# Patient Record
Sex: MALE | Race: WHITE | NOT HISPANIC OR LATINO | URBAN - METROPOLITAN AREA
[De-identification: names, ages, dates, MRNs, and addresses within clinical notes are randomized per-mention and may not be internally consistent; named-entity substitution may affect disease eponyms.]

---

## 2018-02-23 ENCOUNTER — NEW PATIENT (OUTPATIENT)
Dept: URBAN - METROPOLITAN AREA CLINIC 52 | Facility: CLINIC | Age: 68
End: 2018-02-23

## 2018-02-23 VITALS — SYSTOLIC BLOOD PRESSURE: 129 MMHG | HEART RATE: 57 BPM | HEIGHT: 60 IN | DIASTOLIC BLOOD PRESSURE: 75 MMHG

## 2018-02-23 DIAGNOSIS — H43.813: ICD-10-CM

## 2018-02-23 DIAGNOSIS — H25.13: ICD-10-CM

## 2018-02-23 DIAGNOSIS — H33.323: ICD-10-CM

## 2018-02-23 DIAGNOSIS — H40.013: ICD-10-CM

## 2018-02-23 PROCEDURE — 4040F PNEUMOC VAC/ADMIN/RCVD: CPT

## 2018-02-23 PROCEDURE — 67145 PROPH RTA DTCHMNT PC: CPT

## 2018-02-23 PROCEDURE — G8482 FLU IMMUNIZE ORDER/ADMIN: HCPCS

## 2018-02-23 PROCEDURE — 2027F OPTIC NERVE HEAD EVAL DONE: CPT

## 2018-02-23 PROCEDURE — 1036F TOBACCO NON-USER: CPT

## 2018-02-23 PROCEDURE — 92225 OPHTHALMOSCOPY (INITIAL): CPT

## 2018-02-23 PROCEDURE — G8427 DOCREV CUR MEDS BY ELIG CLIN: HCPCS

## 2018-02-23 PROCEDURE — 0517F GLAUCOMA PLAN OF CARE DOCD: CPT

## 2018-02-23 PROCEDURE — 99244 OFF/OP CNSLTJ NEW/EST MOD 40: CPT

## 2018-02-23 ASSESSMENT — TONOMETRY
OS_IOP_MMHG: 18
OD_IOP_MMHG: 23

## 2018-02-23 ASSESSMENT — VISUAL ACUITY
OS_CC: 20/20
OD_CC: 20/30+
OD_CC: 20/40
OS_CC: 20/25+

## 2018-03-02 ENCOUNTER — POST-OP CHECK (OUTPATIENT)
Dept: URBAN - METROPOLITAN AREA CLINIC 52 | Facility: CLINIC | Age: 68
End: 2018-03-02

## 2018-03-02 DIAGNOSIS — H43.813: ICD-10-CM

## 2018-03-02 DIAGNOSIS — H25.13: ICD-10-CM

## 2018-03-02 DIAGNOSIS — H40.013: ICD-10-CM

## 2018-03-02 DIAGNOSIS — H33.323: ICD-10-CM

## 2018-03-02 PROCEDURE — 67145 PROPH RTA DTCHMNT PC: CPT

## 2018-03-02 PROCEDURE — 92226 OPHTHALMOSCOPY (SUB): CPT

## 2018-03-02 PROCEDURE — 99024 POSTOP FOLLOW-UP VISIT: CPT

## 2018-03-02 ASSESSMENT — VISUAL ACUITY
OD_CC: 20/25+1
OS_CC: 20/20-1

## 2018-03-02 ASSESSMENT — TONOMETRY
OS_IOP_MMHG: 21
OD_IOP_MMHG: 22

## 2018-03-26 ENCOUNTER — POST-OP CHECK (OUTPATIENT)
Dept: URBAN - METROPOLITAN AREA CLINIC 52 | Facility: CLINIC | Age: 68
End: 2018-03-26

## 2018-03-26 DIAGNOSIS — H33.323: ICD-10-CM

## 2018-03-26 DIAGNOSIS — H43.813: ICD-10-CM

## 2018-03-26 DIAGNOSIS — H25.13: ICD-10-CM

## 2018-03-26 DIAGNOSIS — H40.013: ICD-10-CM

## 2018-03-26 PROCEDURE — 92226 OPHTHALMOSCOPY (SUB): CPT

## 2018-03-26 PROCEDURE — 99024 POSTOP FOLLOW-UP VISIT: CPT

## 2018-03-26 ASSESSMENT — TONOMETRY
OD_IOP_MMHG: 21
OS_IOP_MMHG: 16

## 2018-03-26 ASSESSMENT — VISUAL ACUITY
OD_CC: 20/25
OS_CC: 20/20

## 2018-06-25 ENCOUNTER — FOLLOW UP (OUTPATIENT)
Dept: URBAN - METROPOLITAN AREA CLINIC 52 | Facility: CLINIC | Age: 68
End: 2018-06-25

## 2018-06-25 DIAGNOSIS — H40.013: ICD-10-CM

## 2018-06-25 DIAGNOSIS — H25.13: ICD-10-CM

## 2018-06-25 DIAGNOSIS — H43.813: ICD-10-CM

## 2018-06-25 DIAGNOSIS — H33.323: ICD-10-CM

## 2018-06-25 PROCEDURE — 4040F PNEUMOC VAC/ADMIN/RCVD: CPT

## 2018-06-25 PROCEDURE — 2027F OPTIC NERVE HEAD EVAL DONE: CPT

## 2018-06-25 PROCEDURE — 92014 COMPRE OPH EXAM EST PT 1/>: CPT

## 2018-06-25 PROCEDURE — 1036F TOBACCO NON-USER: CPT

## 2018-06-25 PROCEDURE — G8427 DOCREV CUR MEDS BY ELIG CLIN: HCPCS

## 2018-06-25 PROCEDURE — 92226 OPHTHALMOSCOPY (SUB): CPT

## 2018-06-25 PROCEDURE — 0517F GLAUCOMA PLAN OF CARE DOCD: CPT

## 2018-06-25 PROCEDURE — 92020 GONIOSCOPY: CPT

## 2018-06-25 PROCEDURE — G8482 FLU IMMUNIZE ORDER/ADMIN: HCPCS

## 2018-06-25 ASSESSMENT — VISUAL ACUITY
OS_CC: 20/25
OD_CC: 20/25
OD_CC: 20/25
OS_CC: 20/20

## 2018-06-25 ASSESSMENT — TONOMETRY
OS_IOP_MMHG: 22
OS_IOP_MMHG: 23
OD_IOP_MMHG: 30
OD_IOP_MMHG: 28

## 2018-10-30 ENCOUNTER — FOLLOW UP (OUTPATIENT)
Dept: URBAN - METROPOLITAN AREA CLINIC 52 | Facility: CLINIC | Age: 68
End: 2018-10-30

## 2018-10-30 DIAGNOSIS — H43.813: ICD-10-CM

## 2018-10-30 DIAGNOSIS — H40.013: ICD-10-CM

## 2018-10-30 DIAGNOSIS — H33.323: ICD-10-CM

## 2018-10-30 DIAGNOSIS — H25.13: ICD-10-CM

## 2018-10-30 PROCEDURE — 92226 OPHTHALMOSCOPY (SUB): CPT

## 2018-10-30 PROCEDURE — 92014 COMPRE OPH EXAM EST PT 1/>: CPT

## 2018-10-30 PROCEDURE — G9903 PT SCRN TBCO ID AS NON USER: HCPCS

## 2018-10-30 PROCEDURE — G8482 FLU IMMUNIZE ORDER/ADMIN: HCPCS

## 2018-10-30 PROCEDURE — 4040F PNEUMOC VAC/ADMIN/RCVD: CPT

## 2018-10-30 PROCEDURE — 0517F GLAUCOMA PLAN OF CARE DOCD: CPT

## 2018-10-30 PROCEDURE — 2027F OPTIC NERVE HEAD EVAL DONE: CPT

## 2018-10-30 PROCEDURE — 1036F TOBACCO NON-USER: CPT

## 2018-10-30 PROCEDURE — G8427 DOCREV CUR MEDS BY ELIG CLIN: HCPCS

## 2018-10-30 ASSESSMENT — TONOMETRY
OS_IOP_MMHG: 31
OS_IOP_MMHG: 30
OD_IOP_MMHG: 35

## 2018-10-30 ASSESSMENT — VISUAL ACUITY
OD_CC: 20/20-1
OS_CC: 20/20
OS_CC: 20/25
OD_CC: 20/20

## 2019-03-14 ENCOUNTER — FOLLOW UP (OUTPATIENT)
Dept: URBAN - METROPOLITAN AREA CLINIC 52 | Facility: CLINIC | Age: 69
End: 2019-03-14

## 2019-03-14 DIAGNOSIS — H43.813: ICD-10-CM

## 2019-03-14 DIAGNOSIS — H40.013: ICD-10-CM

## 2019-03-14 DIAGNOSIS — H25.13: ICD-10-CM

## 2019-03-14 DIAGNOSIS — H33.323: ICD-10-CM

## 2019-03-14 PROCEDURE — 92014 COMPRE OPH EXAM EST PT 1/>: CPT

## 2019-03-14 PROCEDURE — 92226 OPHTHALMOSCOPY (SUB): CPT

## 2019-03-14 ASSESSMENT — VISUAL ACUITY
OD_CC: 20/20
OS_CC: 20/20
OS_CC: 20/25
OD_CC: 20/25

## 2019-03-14 ASSESSMENT — TONOMETRY
OD_IOP_MMHG: 19
OS_IOP_MMHG: 17

## 2020-09-08 ENCOUNTER — FOLLOW UP (OUTPATIENT)
Dept: URBAN - METROPOLITAN AREA CLINIC 52 | Facility: CLINIC | Age: 70
End: 2020-09-08

## 2020-09-08 VITALS — HEIGHT: 60 IN

## 2020-09-08 DIAGNOSIS — H25.13: ICD-10-CM

## 2020-09-08 DIAGNOSIS — H43.813: ICD-10-CM

## 2020-09-08 DIAGNOSIS — H40.013: ICD-10-CM

## 2020-09-08 DIAGNOSIS — H33.323: ICD-10-CM

## 2020-09-08 PROCEDURE — 92201 OPSCPY EXTND RTA DRAW UNI/BI: CPT

## 2020-09-08 PROCEDURE — 92014 COMPRE OPH EXAM EST PT 1/>: CPT

## 2020-09-08 ASSESSMENT — TONOMETRY
OS_IOP_MMHG: 18
OD_IOP_MMHG: 23

## 2020-09-08 ASSESSMENT — VISUAL ACUITY
OS_CC: 20/25
OD_CC: 20/25

## 2024-04-18 ENCOUNTER — TELEPHONE (OUTPATIENT)
Age: 74
End: 2024-04-18

## 2024-04-18 NOTE — TELEPHONE ENCOUNTER
"Grace from the [facility] at [facility] called to inquire if Hanna Peñaloza or Dr. Patel could see Pt for an Ear Cleaning. Pt has a NEW Pt appt with Dr. Patel 5/24/24 but has an Auditory Appt on 4/25/24 and would like it done prior to this Appt.   TC to Hanna Peñaloza. She is ok to see Pt for Ear Cleaning with Debrox prior (\"5 drops bilateral ears two times daily for 4 days\"). Scheduled for 4/23/24/@ 9am, 30min visit.   Called Grace at number she provided. Left Message for her to call back to discuss appt and necessary pre-appointment prep. Left Office Contact Information.  "

## 2024-04-18 NOTE — TELEPHONE ENCOUNTER
Grace called office, relayed information about Debrox and Appt. Grace made note of appt and expressed thanks.

## 2024-04-23 ENCOUNTER — OFFICE VISIT (OUTPATIENT)
Dept: GERIATRICS | Facility: OTHER | Age: 74
End: 2024-04-23
Payer: COMMERCIAL

## 2024-04-23 VITALS
HEIGHT: 64 IN | DIASTOLIC BLOOD PRESSURE: 68 MMHG | SYSTOLIC BLOOD PRESSURE: 122 MMHG | TEMPERATURE: 98.1 F | HEART RATE: 63 BPM | WEIGHT: 186.6 LBS | BODY MASS INDEX: 31.86 KG/M2 | OXYGEN SATURATION: 97 %

## 2024-04-23 DIAGNOSIS — H61.23 EXCESSIVE CERUMEN IN BOTH EAR CANALS: Primary | ICD-10-CM

## 2024-04-23 PROCEDURE — 69210 REMOVE IMPACTED EAR WAX UNI: CPT | Performed by: NURSE PRACTITIONER

## 2024-04-23 RX ORDER — ROPINIROLE 0.5 MG/1
0.5 TABLET, FILM COATED ORAL DAILY
COMMUNITY
Start: 2024-01-01

## 2024-04-23 RX ORDER — LATANOPROST 50 UG/ML
1 SOLUTION/ DROPS OPHTHALMIC
COMMUNITY

## 2024-04-23 NOTE — PROGRESS NOTES
Ear cerumen removal    Date/Time: 4/23/2024 9:00 AM    Performed by: CARLOS ENRIQUE Ge  Authorized by: CARLOS ENRIQUE Ge  Universal Protocol:  Consent: Verbal consent obtained.  Consent given by: patient  Patient understanding: patient states understanding of the procedure being performed  Patient consent: the patient's understanding of the procedure matches consent given  Patient identity confirmed: verbally with patient    Patient location:  Clinic  Indications / Diagnosis:  Excessive cerumen in bilateral ear canals  Procedure details:     Local anesthetic:  None    Location:  L ear and R ear    Procedure type: irrigation with instrumentation      Instrumentation: curette      Approach:  Internal and natural orifice  Post-procedure details:     Complication:  None    Hearing quality:  Improved    Patient tolerance of procedure:  Tolerated well, no immediate complications    [facility]

## 2024-04-25 ENCOUNTER — OFFICE VISIT (OUTPATIENT)
Dept: AUDIOLOGY | Age: 74
End: 2024-04-25
Payer: COMMERCIAL

## 2024-04-25 DIAGNOSIS — H90.3 SENSORY HEARING LOSS, BILATERAL: Primary | ICD-10-CM

## 2024-04-25 PROCEDURE — 92557 COMPREHENSIVE HEARING TEST: CPT | Performed by: AUDIOLOGIST

## 2024-04-25 PROCEDURE — 92567 TYMPANOMETRY: CPT | Performed by: AUDIOLOGIST

## 2024-04-25 PROCEDURE — V5274 ALD UNSPECIFIED: HCPCS | Performed by: AUDIOLOGIST

## 2024-04-25 NOTE — PROGRESS NOTES
Hearing Aid Visit:    Name:  Louis Aguilar  :  1950  Age:  73 y.o.  MRN:  [id number]  Date of Evaluation: 24     HISTORY:    Louis Aguilar was seen today (2024) for a(n) out-of-warranty hearing aid check of his bilateral hearing aids. Today, Louis reports that his right hearing aid doesn' hold its charge the whole day.    DEVICE INFORMATION:     Left Device Right Device   Hearing Aid Make: OtWatch-Sites  OtWatch-Sites    Hearing Aid Model: Vandana 1 Vandana 1   Serial Number: [id number]   Repair Warranty Date: 4/15/24 4/15/24   Loss/Damage Warranty Status:             Length/Output 2, 85 2, 85   Wax System: Pro Wax miniFIT Pro Wax miniFIT   Dome Size/Style: 10mm Open 10mm Open   Battery: Lithium-ion Rechargeable Lithium-ion Rechargeable      Earmold Serial Number: N/A N/A   Earmold Warranty Date:  N/A N/A    Serial Number:       Warranty Date:  4/15/24     Accessories:          ACTION/ADJUSTMENTS:    Hearing aids cleaned and checked - good sound quality. Connected to NITA - battery health poor in right aid. Changed battery. Dispensed domes and wax guards.    RECOMMENDATIONS:     Patient will contact center with any concerns.      Pauline Fernando  Clinical Audiologist  [facility]  [address] [place] [id number]

## 2024-04-25 NOTE — PROGRESS NOTES
Diagnostic Hearing Evaluation    Name:  Louis Aguilar  :  1950  Age:  73 y.o.   MRN:  [id number]  Date of Evaluation: 24     HISTORY:     Reason for visit:  known hearing loss, aided    Louis Aguilar is being seen today at the request of Dr. Patel for an initial  evaluation of hearing. Patient reports history of bilateral hearing loss.  Patient denies otalgia, but reports bilateral intermittent tinnitus and some episodes of dizziness/lightheadedness. He notes previous episodes of vertigo, but no recent episodes.    EVALUATION:    Otoscopic Evaluation:   Right Ear: Unremarkable, canal clear   Left Ear: Unremarkable, canal clear    Tympanometry:   Right Ear: Type A; normal middle ear pressure and static compliance    Left Ear: Type A; normal middle ear pressure and static compliance     Speech Audiometry:  Speech Reception (SRT)    Right Ear: 25 dB HL    Left Ear: 25 dB HL    Word Recognition Scores (WRS):  Right Ear: excellent (100 % correct)     Left Ear: excellent (100 % correct)    Stimuli: [id number]    Pure Tone Audiometry:  Conventional pure tone audiometry from 250 - 8000 Hz  was obtained with good reliability and revealed borderline normal hearing [id number] Hz sloping to moderate sensorineural hearing loss in each ear.    *see attached audiogram      RECOMMENDATIONS:  Annual hearing eval, Return to [facility]. for F/U, and Copy to Patient/Caregiver    PATIENT EDUCATION:   The results of today's results and recommendations were reviewed with the patient and his hearing thresholds were explained at length. Treatment options, including amplification and communication strategies, were discussed as appropriate. The patient voiced understanding of his test results. Questions were addressed and the patient was encouraged to contact our department should concerns arise.      Pauline Fernando  Clinical Audiologist  [facility]  [address]  [phone]

## 2024-05-22 ENCOUNTER — OFFICE VISIT (OUTPATIENT)
Dept: NEUROLOGY | Facility: CLINIC | Age: 74
End: 2024-05-22
Payer: COMMERCIAL

## 2024-05-22 ENCOUNTER — TELEPHONE (OUTPATIENT)
Dept: NEUROLOGY | Facility: CLINIC | Age: 74
End: 2024-05-22

## 2024-05-22 VITALS — HEART RATE: 59 BPM | SYSTOLIC BLOOD PRESSURE: 138 MMHG | DIASTOLIC BLOOD PRESSURE: 68 MMHG

## 2024-05-22 DIAGNOSIS — G25.0 TREMOR, ESSENTIAL: Primary | ICD-10-CM

## 2024-05-22 PROCEDURE — 99204 OFFICE O/P NEW MOD 45 MIN: CPT | Performed by: PSYCHIATRY & NEUROLOGY

## 2024-05-22 RX ORDER — TOPIRAMATE 25 MG/1
TABLET ORAL
Qty: 90 TABLET | Refills: 1 | Status: SHIPPED | OUTPATIENT
Start: 2024-05-22 | End: 2024-05-23

## 2024-05-22 NOTE — PROGRESS NOTES
Patient ID: Louis Aguilar is a 73 y.o. male.    Assessment/Plan:    Tremor, essential  Patient is a 73 year old with hx of mild L carotid stenosis presenting for bilateral hand tremors. Tremors during action only but is currently on ropinirole 0.5mg TID. Tremors started in 2016 which then resolved and then returned 2023 and saw Neurologist in [place] who placed him on ropinirole and clonazepam. Patient stopped clonazepam because of sedation issues and continued on ropinirole but has difficulty keeping with frequency of dosing. Patient unsure if ropinirole is helping at this time. Denies falls, significant bradykinesia, cogwheel rigidity, hypophonia, masked facies. Otherwise, no numbness, no weakness, no other ambulatory dysfunction except possibly some mild slowing. No family history of Parkinson's nor essential tremors. Exam significant for bilateral L>R intention and some postural tremors but no rest tremors observed. No shuffling gait, asymmetric armswing, cogwheel rigidity, postural instability seen on today's exam.    Workup from [place], [place]:  Carotid duplex 11/14/2023: Atherosclerotic plaque in carotid bulbs with less than 40% stenosis  EEG routine 1/11/2024: normal EEG    Impression: Patient most likely has essential tremors as tremors are only during action. There is some concern that ropinirole maybe affecting the clinical presentation today as patient did take ropinirole this morning.    Plan:  Discussed with Dr. Zapata  Start topamax 25mg nightly and then the week after increase to 50mg nightly (patient has no hx of nephrolithiasis)  Ordered MRI brain w and wo contrast due to asymmetry of tremors L>R) with relatively soon appearance of the tremors  Patient to followup in 3 months  Patient agreeable to above       Diagnoses and all orders for this visit:    Tremor, essential  -     MRI brain with and without contrast; Future  -     topiramate (Topamax) 25 mg tablet; Take 25mg (1 tab) nightly for 1 week  and then increase to 50mg (2 tabs) nightly after  -     BUN; Future  -     Creatinine, serum; Future           Subjective:    Patient is a 73 year old right handed man with hx of left carotid atherosclerosis, thrombocytopenia presenting for bilateral hand tremors L>R. Patient in 2016 was diagnosed with essential tremors in both hands which have subsided. According to Dr. Adams 10/31/2023, patient had been developing increasing tremors bilaterally of the hands but were getting worse with holding cup or spoon at itmes (according to patient, tremors starting worsening around a year ago during spring 2023). He also had generalized slowing of his activities and slow steppage gait and feeling unsteady. Tremors only during action and can be seen when using utensils. Never seen at rest. Never seen when buttoning shirt, brushing teeth and occasionally during writing as well. Also recurrent dizzy spells and lightheadedness. Patient placed on requip 0.5 TID and clonazepam 0.5mg BID. Patient stopped taking the clonazepam around a month ago because of sedative side effects. Patient has been up to increased to requip 0.5 4 times a day but has a hard time handling the more frequent dosing (currently at TID but but sometimes miss lunchtime dosing). Patient did take ropinirole this morning.    No falls in his history. Does not drop objects but can have some spillage of liquids. Patient gets self conscious due to the tremors.    Denies cogwheel rigidity, bradykinesia but in setting of stiff joints. Patient thinks maybe mild shuffling gait. No abnormal en bloc turning. No dysarthria or dysphagia. No hypophonia, no masked facies, maybe mild smaller handwriting. No constipation or REM behavior sleep disorder. Nobody in the family with Parkinson's Disease. Mother had dementia. Nobody else in the family with tremors.          The following portions of the patient's history were reviewed and updated as appropriate: He  has no past medical  history on file.  He   Patient Active Problem List    Diagnosis Date Noted    Tremor, essential 05/22/2024    Excessive cerumen in both ear canals 04/23/2024     He  has no past surgical history on file.  His family history is not on file.  He  has no history on file for tobacco use, alcohol use, and drug use.  Current Outpatient Medications   Medication Sig Dispense Refill    latanoprost (XALATAN) 0.005 % ophthalmic solution Administer 1 drop to both eyes daily at bedtime      rOPINIRole (REQUIP) 0.5 mg tablet Take 0.5 mg by mouth daily      Rosuvastatin Calcium 20 MG CPSP Take 20 mg by mouth daily      TAMSULOSIN HCL PO Take 0.4 mg by mouth daily      topiramate (Topamax) 25 mg tablet Take 25mg (1 tab) nightly for 1 week and then increase to 50mg (2 tabs) nightly after 90 tablet 1     No current facility-administered medications for this visit.     Current Outpatient Medications on File Prior to Visit   Medication Sig    latanoprost (XALATAN) 0.005 % ophthalmic solution Administer 1 drop to both eyes daily at bedtime    rOPINIRole (REQUIP) 0.5 mg tablet Take 0.5 mg by mouth daily    Rosuvastatin Calcium 20 MG CPSP Take 20 mg by mouth daily    TAMSULOSIN HCL PO Take 0.4 mg by mouth daily     No current facility-administered medications on file prior to visit.     He has No Known Allergies..         Objective:    Blood pressure 138/68, pulse 59.    Physical Exam  Eyes:      Extraocular Movements: Extraocular movements intact.   Neurological:      Motor: Motor strength is normal.     Deep Tendon Reflexes: Reflexes are normal and symmetric.   Psychiatric:         Speech: Speech normal.         Neurological Exam  Mental Status  Awake, alert and oriented to person, place and time. Speech is normal. Language is fluent with no aphasia. Attention and concentration are normal.    Cranial Nerves  CN II: Visual fields full to confrontation.  CN III, IV, VI: Extraocular movements intact bilaterally.  CN V: Facial sensation is  normal.  CN VII: Full and symmetric facial movement.  CN XI: Shoulder shrug strength is normal.  CN XII: Tongue midline without atrophy or fasciculations.    Motor   No abnormal involuntary movements. Strength is 5/5 throughout all four extremities.    Sensory  Light touch is normal in upper and lower extremities.     Reflexes  Deep tendon reflexes are 2+ and symmetric in all four extremities.    Coordination  Right: Finger-to-nose normal.Left: Finger-to-nose normal.  Spiral test shows L>R hand tremor    Bilateral intention tremor bilaterally of hands    No significant cogwheel rigidity bilaterally on contralateral activation    KIKI of bilateral upper and lower extremities slightly slower than normal.    Gait  Casual gait is normal including stance, stride, and arm swing.  No shuffling gait, symmetric armswing, normal en bloc turning.        ROS:    Review of Systems   Constitutional:  Positive for fatigue. Negative for appetite change and fever.   HENT: Negative.  Negative for hearing loss, tinnitus, trouble swallowing and voice change.    Eyes: Negative.  Negative for photophobia, pain and visual disturbance.   Respiratory: Negative.  Negative for shortness of breath.    Cardiovascular: Negative.  Negative for palpitations.   Gastrointestinal: Negative.  Negative for nausea and vomiting.   Endocrine: Negative.  Negative for cold intolerance.   Genitourinary: Negative.  Negative for dysuria, frequency and urgency.   Musculoskeletal:  Positive for gait problem (Slow walking). Negative for back pain, myalgias, neck pain and neck stiffness.        Joint Stiffness     Skin: Negative.  Negative for rash.   Allergic/Immunologic: Negative.    Neurological:  Positive for dizziness, tremors (Hands) and weakness (Minimal). Negative for seizures, syncope, facial asymmetry, speech difficulty, light-headedness, numbness and headaches.   Hematological: Negative.  Does not bruise/bleed easily.   Psychiatric/Behavioral:  Positive  for sleep disturbance. Negative for confusion and hallucinations.    All other systems reviewed and are negative.

## 2024-05-22 NOTE — PATIENT INSTRUCTIONS
Get MRI brain before next visit    Start topamax 25mg nightly and then increase to 50mg nightly next week    Followup in 3 months

## 2024-05-22 NOTE — ASSESSMENT & PLAN NOTE
Patient is a 73 year old with hx of mild L carotid stenosis presenting for bilateral hand tremors. Tremors during action only but is currently on ropinirole 0.5mg TID. Tremors started in 2016 which then resolved and then returned 2023 and saw Neurologist in [place] who placed him on ropinirole and clonazepam. Patient stopped clonazepam because of sedation issues and continued on ropinirole but has difficulty keeping with frequency of dosing. Patient unsure if ropinirole is helping at this time. Denies falls, significant bradykinesia, cogwheel rigidity, hypophonia, masked facies. Otherwise, no numbness, no weakness, no other ambulatory dysfunction except possibly some mild slowing. No family history of Parkinson's nor essential tremors. Exam significant for bilateral L>R intention and some postural tremors but no rest tremors observed. No shuffling gait, asymmetric armswing, cogwheel rigidity, postural instability seen on today's exam.    Workup from [place], [place]:  Carotid duplex 11/14/2023: Atherosclerotic plaque in carotid bulbs with less than 40% stenosis  EEG routine 1/11/2024: normal EEG    Impression: Patient most likely has essential tremors as tremors are only during action. There is some concern that ropinirole maybe affecting the clinical presentation today as patient did take ropinirole this morning.    Plan:  Discussed with Dr. Zapata  Start topamax 25mg nightly and then the week after increase to 50mg nightly (patient has no hx of nephrolithiasis)  Ordered MRI brain w and wo contrast due to asymmetry of tremors L>R) with relatively soon appearance of the tremors  Patient to followup in 3 months  Patient agreeable to above

## 2024-05-22 NOTE — TELEPHONE ENCOUNTER
Pt has concerns regarding side affects with the topiramate 25mg tablets. He stated he is a glaucoma suspect, he was dx with ocular hypertension.       Pt would like to confirm if this is safe before he starts the medication.     Pt#: [phone]

## 2024-05-23 DIAGNOSIS — G25.0 TREMOR, ESSENTIAL: Primary | ICD-10-CM

## 2024-05-23 PROBLEM — H61.23 EXCESSIVE CERUMEN IN BOTH EAR CANALS: Status: RESOLVED | Noted: 2024-04-23 | Resolved: 2024-05-23

## 2024-05-23 RX ORDER — PRIMIDONE 50 MG/1
TABLET ORAL
Qty: 60 TABLET | Refills: 5 | Status: SHIPPED | OUTPATIENT
Start: 2024-05-23

## 2024-05-23 NOTE — TELEPHONE ENCOUNTER
"Patient aware of recommendation and is receptive to start primidone instead of topamax.  Please send script to [facility].  Also patient is asking to please confirm the plan is to wean off requip as he feels it's \"not terribly effective\"  thank you.  "

## 2024-05-23 NOTE — TELEPHONE ENCOUNTER
Received voicemail 5/23/24 10:18am  Louis solo [facility] december 16. 1950. I was in to see doctor palak yesterday and she prescribed a medicine that I need that I'm not sure I should, I should be taking, taking based on my uh, uh, glaucoma suspected diagnosis. So she could call me back so we could discuss this further. I'd appreciate it. Thank you. [phone]

## 2024-05-23 NOTE — TELEPHONE ENCOUNTER
Laurita Soto MD  Ne[facility] [place] Clinical Team 457 minutes ago (2:43 PM)       Yes plan is to wean off of ropinirole.  He is to take 1 tab twice daily for 1 week then 1 tab daily for a week then discontinue.  Will send in script.   ____________    Patient aware of recommendation and verbalized understanding.

## 2024-05-23 NOTE — TELEPHONE ENCOUNTER
Laurita Soto MD  Neurology [place] Clinical Team 435 minutes ago (12:22 PM)       Secondary  angle closure glaucoma is listed as a possible adverse reaction. If there is a question, we should avoid.    We spoke of alternatives in the office. We could instead start primidone 50mg 1 tab nightly for a week then if no improvement  2 tabs nightly. Let me know if agreeable and script can be sent in.  Most common side effect is sedation.

## 2024-05-24 ENCOUNTER — OFFICE VISIT (OUTPATIENT)
Dept: GERIATRICS | Facility: OTHER | Age: 74
End: 2024-05-24
Payer: COMMERCIAL

## 2024-05-24 VITALS
BODY MASS INDEX: 33.31 KG/M2 | SYSTOLIC BLOOD PRESSURE: 124 MMHG | WEIGHT: 188 LBS | DIASTOLIC BLOOD PRESSURE: 80 MMHG | HEART RATE: 68 BPM | OXYGEN SATURATION: 97 % | TEMPERATURE: 97.3 F | HEIGHT: 63 IN

## 2024-05-24 DIAGNOSIS — G25.0 TREMOR, ESSENTIAL: ICD-10-CM

## 2024-05-24 DIAGNOSIS — Z23 NEED FOR TDAP VACCINATION: ICD-10-CM

## 2024-05-24 DIAGNOSIS — R97.20 BPH WITH ELEVATED PSA: ICD-10-CM

## 2024-05-24 DIAGNOSIS — N40.0 BPH WITH ELEVATED PSA: ICD-10-CM

## 2024-05-24 DIAGNOSIS — E66.09 CLASS 1 OBESITY DUE TO EXCESS CALORIES WITHOUT SERIOUS COMORBIDITY WITH BODY MASS INDEX (BMI) OF 33.0 TO 33.9 IN ADULT: ICD-10-CM

## 2024-05-24 DIAGNOSIS — K63.5 POLYP OF COLON, UNSPECIFIED PART OF COLON, UNSPECIFIED TYPE: Primary | ICD-10-CM

## 2024-05-24 DIAGNOSIS — E78.2 MIXED HYPERLIPIDEMIA: ICD-10-CM

## 2024-05-24 DIAGNOSIS — D69.6 THROMBOCYTOPENIA (HCC): ICD-10-CM

## 2024-05-24 PROBLEM — E66.9 OBESITY: Status: ACTIVE | Noted: 2024-05-24

## 2024-05-24 PROBLEM — H40.059 OCULAR HYPERTENSION: Status: ACTIVE | Noted: 2024-05-24

## 2024-05-24 PROBLEM — E78.5 HYPERLIPIDEMIA: Status: ACTIVE | Noted: 2024-05-24

## 2024-05-24 PROCEDURE — 99214 OFFICE O/P EST MOD 30 MIN: CPT | Performed by: FAMILY MEDICINE

## 2024-05-24 NOTE — ASSESSMENT & PLAN NOTE
Continue rosuvastatin  Able to climb a flight of stairs without chest pain or shortness of breath  Monitor symptoms with slowly increasing activity- consider stress test  Dietary/lifestyle modification reviewed  Follow up recent labs/ most recent TSH and lipid panel, will order accordingly at next visit

## 2024-05-24 NOTE — PATIENT INSTRUCTIONS
-Continue to monitor symptoms on primidone- side effects and is it helping your tremor  -Slowly increase exercise, note any symptoms of chest pain or shortness of breath  -Due for Tdap (tetanus) vaccine  -Follow up in 6-8 weeks

## 2024-05-24 NOTE — ASSESSMENT & PLAN NOTE
Last colonoscopy November 2023- awaiting records for review  Polyps removed per patient; +FH colon cancer- mother  Due for repeat colonoscopy in 3-5 years, would err closer to 3 years given family history, next due November 2026

## 2024-05-24 NOTE — ASSESSMENT & PLAN NOTE
Symptoms controlled with tamsulosin  Awaiting records, PSA downtrended and WNL when recently checked per patient  Will plan to recheck PSA in 6 months from last level, then return to yearly surveillance if WNL

## 2024-05-24 NOTE — ASSESSMENT & PLAN NOTE
Chronic, idiopathic per history with baseline platelets reported to be 90s-100s  Awaiting recent lab results  Will plan to check CBC, CMP, and B12 and folic acid levels if not recently checked

## 2024-05-24 NOTE — PROGRESS NOTES
[facility]  [facility]    NAME: Louis Aguilar  AGE: 73 y.o. SEX: male    DATE OF ENCOUNTER: 5/24/24    Assessment and Plan     Problem List Items Addressed This Visit          Digestive    Colon polyps - Primary     Last colonoscopy November 2023- awaiting records for review  Polyps removed per patient; +FH colon cancer- mother  Due for repeat colonoscopy in 3-5 years, would err closer to 3 years given family history, next due November 2026            Nervous and Auditory    Tremor, essential     Following with neurology  Started primidone last night, weaning from ropinirole  Monitor side effects and benefits from primidone, notify if unable to tolerate primidone due to sedation, dizziness, etc            Genitourinary    BPH with elevated PSA     Symptoms controlled with tamsulosin  Awaiting records, PSA downtrended and WNL when recently checked per patient  Will plan to recheck PSA in 6 months from last level, then return to yearly surveillance if WNL            Hematopoietic and Hemostatic    Thrombocytopenia (HCC)     Chronic, idiopathic per history with baseline platelets reported to be 90s-100s  Awaiting recent lab results  Will plan to check CBC, CMP, and B12 and folic acid levels if not recently checked            Other    Hyperlipidemia     Continue rosuvastatin  Able to climb a flight of stairs without chest pain or shortness of breath  Monitor symptoms with slowly increasing activity- consider stress test  Dietary/lifestyle modification reviewed  Follow up recent labs/ most recent TSH and lipid panel, will order accordingly at next visit         Obesity     Dietary and lifestyle modification reviewed         Need for Tdap vaccination     Due for Tdap- will send order to pharmacy         Relevant Medications    tetanus-diphtheria-acellular pertussis (ADACEL) 5-2-15.5 LF-mcg/0.5 injection       Chief Complaint     Chief Complaint   Patient presents with    New Patient Visit  "      History of Present Illness     73 year old male presents to establish care. Patient had requested records from PCP well ahead of our visit, however they are not available at time of encounter. Limited records pulled from [facility] and reviewed. Recent neurology and audiology notes in the [facility] system reviewed.   Started primidone last evening for essential tremor, feeling \"dopey\" when waking up. Weaning off of ropinirole. Diagnosed originally in 2016 vs preParkinson.   Recent PSA test with elevation, repeat one month again had gone down per patient; on tamsulosin. No symptoms.  Saw audiology in April, slight adjustments to bilateral hearing aids made.   Colonoscopy- 2023; some polyps removed; mother had colon cancer in 70s; next due in   On latanoprost for ocular hypertension; wears glasses. Has seen [facility] recently. Trouble with focus when reading for 15 minutes +. No eye pain or headache.   Blood work- recent PCP visit prior to move in March; reported recent PSA and metabolic panel.  Chronic thrombocytopenia- 90s-100s, stable.   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    Review of Systems     Review of Systems   Constitutional:  Positive for unexpected weight change (weight gain reported).   HENT:  Negative for trouble swallowing.    Respiratory:  Positive for shortness of breath (with walking longer " "distance).    Cardiovascular:  Negative for chest pain (can climb flight of stairs without chest pain, SOB).       Active Problem List     Patient Active Problem List   Diagnosis    Tremor, essential    Hyperlipidemia    Ocular hypertension    Obesity    BPH with elevated PSA    Thrombocytopenia (HCC)    Need for Tdap vaccination    Colon polyps       Objective     /80 (BP Location: Left arm, Patient Position: Sitting, Cuff Size: Standard)   Pulse 68   Temp (!) 97.3 °F (36.3 °C) (Temporal)   Ht 5' 3\" (1.6 m)   Wt 85.3 kg (188 lb)   SpO2 97%   BMI 33.30 kg/m²     Physical Exam  Vitals and nursing note reviewed.   Constitutional:       General: He is awake. He is not in acute distress.     Appearance: He is well-groomed. He is not ill-appearing, toxic-appearing or diaphoretic.   HENT:      Head: Normocephalic and atraumatic.      Nose: No rhinorrhea.      Mouth/Throat:      Mouth: Mucous membranes are moist.   Eyes:      General: No scleral icterus.        Right eye: No discharge.         Left eye: No discharge.      Conjunctiva/sclera: Conjunctivae normal.   Cardiovascular:      Rate and Rhythm: Normal rate and regular rhythm.      Heart sounds: S1 normal and S2 normal.   Pulmonary:      Effort: Pulmonary effort is normal. No respiratory distress.      Breath sounds: No wheezing.   Abdominal:      General: There is no distension.   Musculoskeletal:      Cervical back: Neck supple. No rigidity.   Lymphadenopathy:      Cervical: No cervical adenopathy.   Skin:     General: Skin is warm and dry.      Coloration: Skin is not jaundiced or pale.   Neurological:      Mental Status: He is alert.      Cranial Nerves: No dysarthria or facial asymmetry.   Psychiatric:         Attention and Perception: Attention and perception normal.         Mood and Affect: Mood and affect normal.         Behavior: Behavior is cooperative.       Pertinent Laboratory/Diagnostic Studies:  BUN 15, Creat 1.15 (5/22/24)     Current " Medications     Current Outpatient Medications:     latanoprost (XALATAN) 0.005 % ophthalmic solution, Administer 1 drop to both eyes daily at bedtime, Disp: , Rfl:     primidone (MYSOLINE) 50 mg tablet, 1 po qhs x 1 week then 2 po qhs, Disp: 60 tablet, Rfl: 5    rOPINIRole (REQUIP) 0.5 mg tablet, Take 0.5 mg by mouth daily, Disp: , Rfl:     Rosuvastatin Calcium 20 MG CPSP, Take 20 mg by mouth daily, Disp: , Rfl:     TAMSULOSIN HCL PO, Take 0.4 mg by mouth daily, Disp: , Rfl:     tetanus-diphtheria-acellular pertussis (ADACEL) 5-2-15.5 LF-mcg/0.5 injection, Inject 0.5 mL into a muscle once for 1 dose, Disp: 0.5 mL, Rfl: 0    Mela Patel, DO  5/24/24

## 2024-05-24 NOTE — ASSESSMENT & PLAN NOTE
Following with neurology  Started primidone last night, weaning from ropinirole  Monitor side effects and benefits from primidone, notify if unable to tolerate primidone due to sedation, dizziness, etc

## 2024-06-14 DIAGNOSIS — G25.0 TREMOR, ESSENTIAL: ICD-10-CM

## 2024-06-17 RX ORDER — PRIMIDONE 50 MG/1
TABLET ORAL
Qty: 180 TABLET | Refills: 0 | Status: SHIPPED | OUTPATIENT
Start: 2024-06-17

## 2024-06-19 ENCOUNTER — HOSPITAL ENCOUNTER (OUTPATIENT)
Dept: MRI IMAGING | Facility: HOSPITAL | Age: 74
Discharge: HOME | End: 2024-06-19
Payer: COMMERCIAL

## 2024-06-19 DIAGNOSIS — G25.0 TREMOR, ESSENTIAL: ICD-10-CM

## 2024-06-19 PROCEDURE — 70553 MRI BRAIN STEM W/O & W/DYE: CPT

## 2024-06-19 PROCEDURE — A9585 GADOBUTROL INJECTION: HCPCS | Performed by: PSYCHIATRY & NEUROLOGY

## 2024-06-19 RX ORDER — GADOBUTROL 604.72 MG/ML
8.5 INJECTION INTRAVENOUS
Status: COMPLETED | OUTPATIENT
Start: 2024-06-19 | End: 2024-06-19

## 2024-06-19 RX ADMIN — GADOBUTROL 8.5 ML: 604.72 INJECTION INTRAVENOUS at 08:30

## 2024-07-26 ENCOUNTER — OFFICE VISIT (OUTPATIENT)
Dept: GERIATRICS | Facility: OTHER | Age: 74
End: 2024-07-26
Payer: COMMERCIAL

## 2024-07-26 VITALS
DIASTOLIC BLOOD PRESSURE: 80 MMHG | BODY MASS INDEX: 33.98 KG/M2 | SYSTOLIC BLOOD PRESSURE: 130 MMHG | OXYGEN SATURATION: 97 % | HEART RATE: 57 BPM | WEIGHT: 191.8 LBS | TEMPERATURE: 98.1 F

## 2024-07-26 DIAGNOSIS — D69.6 THROMBOCYTOPENIA (HCC): Primary | ICD-10-CM

## 2024-07-26 DIAGNOSIS — G25.0 TREMOR, ESSENTIAL: ICD-10-CM

## 2024-07-26 DIAGNOSIS — R97.20 BPH WITH ELEVATED PSA: ICD-10-CM

## 2024-07-26 DIAGNOSIS — E78.2 MIXED HYPERLIPIDEMIA: ICD-10-CM

## 2024-07-26 DIAGNOSIS — N40.0 BPH WITH ELEVATED PSA: ICD-10-CM

## 2024-07-26 PROCEDURE — 99214 OFFICE O/P EST MOD 30 MIN: CPT | Performed by: FAMILY MEDICINE

## 2024-07-26 NOTE — PROGRESS NOTES
[facility]  [facility]    NAME: Louis Aguilar  AGE: 73 y.o. SEX: male    DATE OF ENCOUNTER: 7/26/24    Assessment and Plan     Problem List Items Addressed This Visit          Nervous and Auditory    Tremor, essential     Following with neurology- next appointment in August  Patient reports no significant improvement with primidone; previously weaned from ropinirole   MRI brain report reviewed- ?need for NPH workup, will refer to neurology- no gait, cognitive, incontinence symptoms reported  Consider copper or iron deposition disease in setting of tremor, chronic thrombocytopenia- workup for thrombocytopenia as below with further  workup based on results (ie. Elevated Hgb or abnormal LFTs)            Genitourinary    BPH with elevated PSA     Symptoms controlled with tamsulosin  PSA improving per most recent labs- repeat PSA ordered         Relevant Orders    PSA Total (Reflex To Free)       Hematopoietic and Hemostatic    Thrombocytopenia (HCC) - Primary     Chronic, idiopathic per history with baseline platelets reported to be 90s-100s  Labs ordered: CBC, CMP, and B12 and folic acid levels         Relevant Orders    CBC and differential    Comprehensive metabolic panel    Folate    Vitamin B12       Other    Hyperlipidemia     Continue rosuvastatin  Dietary/lifestyle modification reviewed  Lipid panel, TSH ordered         Relevant Orders    TSH, 3rd generation with Free T4 reflex    Lipid panel       Orders Placed This Encounter   Procedures    TSH, 3rd generation with Free T4 reflex    Lipid panel    CBC and differential    Comprehensive metabolic panel    PSA Total (Reflex To Free)    Folate    Vitamin B12       Chief Complaint     Chief Complaint   Patient presents with    Follow-up     Weight concerns, weight has gone up since he has been at [facility] [email or web address]        History of Present Illness     73 year old male presents for follow up.  3lb weight gain since last visit. Dietary changes since  moving To [facility]. Has been trying to eat less overall.   Minimal exercise. Walking around the facility for usual daily activities but nothing structured.  No leg or abdominal swelling. Notes wheezing intermittently. Chronic sinus disease.  Initial lightheadedness with primidone. Tremors without significant change; some days better than others.   No known family history of liver disease or iron/copper deposition disease; no known family history of tremor.       The following portions of the patient's history were reviewed and updated as appropriate: allergies, current medications, past family history, past medical history, past social history, past surgical history and problem list.    Review of Systems     Review of Systems   Constitutional:  Positive for activity change and unexpected weight change. Negative for appetite change and fever.   HENT:  Negative for postnasal drip.    Respiratory:  Positive for wheezing. Negative for chest tightness and shortness of breath.    Cardiovascular:  Negative for leg swelling.   Gastrointestinal:  Negative for abdominal distention.   Genitourinary:  Negative for difficulty urinating.   Neurological:  Positive for tremors. Negative for dizziness and light-headedness.       Active Problem List     Patient Active Problem List   Diagnosis    Tremor, essential    Hyperlipidemia    Ocular hypertension    Obesity    BPH with elevated PSA    Thrombocytopenia (HCC)    Need for Tdap vaccination    Colon polyps       Objective     /80 (BP Location: Left arm, Patient Position: Sitting, Cuff Size: Adult)   Pulse 57   Temp 98.1 °F (36.7 °C) (Temporal)   Wt 87 kg (191 lb 12.8 oz)   SpO2 97%   BMI 33.98 kg/m²     Physical Exam  Vitals reviewed.   Constitutional:       General: He is awake. He is not in acute distress.     Appearance: He is well-groomed. He is not ill-appearing, toxic-appearing or diaphoretic.   HENT:      Head: Normocephalic and atraumatic.      Nose:  No rhinorrhea.      Mouth/Throat:      Mouth: Mucous membranes are moist.   Eyes:      General: No scleral icterus.        Right eye: No discharge.         Left eye: No discharge.      Conjunctiva/sclera: Conjunctivae normal.   Cardiovascular:      Rate and Rhythm: Normal rate and regular rhythm.   Pulmonary:      Effort: Pulmonary effort is normal. No respiratory distress.      Breath sounds: No wheezing.   Abdominal:      General: There is no distension.      Palpations: Abdomen is soft. There is no hepatomegaly, splenomegaly or mass.      Tenderness: There is no abdominal tenderness.   Musculoskeletal:      Cervical back: No rigidity.      Right lower leg: No edema.      Left lower leg: No edema.   Skin:     General: Skin is warm and dry.      Coloration: Skin is not jaundiced or pale.   Neurological:      Mental Status: He is alert. Mental status is at baseline.      Cranial Nerves: No dysarthria or facial asymmetry.   Psychiatric:         Attention and Perception: Attention and perception normal.         Speech: Speech normal.         Behavior: Behavior is cooperative.         Thought Content: Thought content normal.         Pertinent Laboratory/Diagnostic Studies:  No new labs since last visit  MRI brain (6/19/24) asymmetric ventricular enlargement; abnormal white matter signal    Current Medications     Current Outpatient Medications:     latanoprost (XALATAN) 0.005 % ophthalmic solution, Administer 1 drop to both eyes daily at bedtime, Disp: , Rfl:     primidone (MYSOLINE) 50 mg tablet, TAKE 1 TAB AT BEDTIME FOR 1 WEEK THEN 2 TABS AT BEDTIME, Disp: 180 tablet, Rfl: 0    Rosuvastatin Calcium 20 MG CPSP, Take 20 mg by mouth daily, Disp: , Rfl:     TAMSULOSIN HCL PO, Take 0.4 mg by mouth daily, Disp: , Rfl:     Mela Patel, DO  7/26/24

## 2024-08-17 NOTE — ASSESSMENT & PLAN NOTE
Following with neurology- next appointment in August  Patient reports no significant improvement with primidone; previously weaned from ropinirole   MRI brain report reviewed- ?need for NPH workup, will refer to neurology- no gait, cognitive, incontinence symptoms reported  Consider copper or iron deposition disease in setting of tremor, chronic thrombocytopenia- workup for thrombocytopenia as below with further  workup based on results (ie. Elevated Hgb or abnormal LFTs)

## 2024-08-17 NOTE — ASSESSMENT & PLAN NOTE
Chronic, idiopathic per history with baseline platelets reported to be 90s-100s  Labs ordered: CBC, CMP, and B12 and folic acid levels

## 2024-08-26 ENCOUNTER — OFFICE VISIT (OUTPATIENT)
Dept: NEUROLOGY | Facility: CLINIC | Age: 74
End: 2024-08-26
Payer: COMMERCIAL

## 2024-08-26 VITALS
WEIGHT: 193.2 LBS | BODY MASS INDEX: 34.22 KG/M2 | DIASTOLIC BLOOD PRESSURE: 70 MMHG | SYSTOLIC BLOOD PRESSURE: 138 MMHG | HEART RATE: 61 BPM

## 2024-08-26 DIAGNOSIS — R90.89 MRI OF BRAIN ABNORMAL: ICD-10-CM

## 2024-08-26 DIAGNOSIS — G25.0 TREMOR, ESSENTIAL: Primary | ICD-10-CM

## 2024-08-26 PROCEDURE — 99214 OFFICE O/P EST MOD 30 MIN: CPT | Performed by: PSYCHIATRY & NEUROLOGY

## 2024-08-26 RX ORDER — PRIMIDONE 50 MG/1
TABLET ORAL
Qty: 270 TABLET | Refills: 2 | Status: SHIPPED | OUTPATIENT
Start: 2024-08-26

## 2024-08-26 NOTE — PROGRESS NOTES
Review of Systems   Constitutional:  Positive for unexpected weight change (Weight Gain). Negative for appetite change, fatigue and fever.   HENT: Negative.  Negative for hearing loss, tinnitus, trouble swallowing and voice change.    Eyes: Negative.  Negative for photophobia, pain and visual disturbance.   Respiratory: Negative.  Negative for shortness of breath.    Cardiovascular: Negative.  Negative for palpitations.   Gastrointestinal: Negative.  Negative for nausea and vomiting.   Endocrine: Negative.  Negative for cold intolerance.   Genitourinary: Negative.  Negative for dysuria, frequency and urgency.   Musculoskeletal:  Positive for gait problem (At times shuffles feet) and myalgias (Left Knee and Thigh often get Stiff). Negative for back pain, neck pain and neck stiffness.   Skin: Negative.  Negative for rash.   Allergic/Immunologic: Negative.    Neurological:  Positive for tremors (Hands, has stayed about the same). Negative for dizziness, seizures, syncope, facial asymmetry, speech difficulty, weakness, light-headedness, numbness and headaches.   Hematological: Negative.  Does not bruise/bleed easily.   Psychiatric/Behavioral: Negative.  Negative for confusion, hallucinations and sleep disturbance.    All other systems reviewed and are negative.

## 2024-08-26 NOTE — ASSESSMENT & PLAN NOTE
Mild L>R right action tremor with subtle decrement on finger taps. Exam suggestive of mild essential tremor.  He is currently on primidone 100 mg nightly with out clear improvement in tremor.  Avoided beta-blockers given potential bradycardia.  We avoided topiramate given ocular hypertension question of glaucoma.    Mri brain reviewed- with asymmetric enlargement of the lateral ventricles and altered signal which could be white matter changes or possibly related to interstitial fluid associated with no pressure hydrocephalus.  Clinically his exam is not suggestive of normal pressure hydrocephalus.  He is has no cognitive difficulties.  He has urinary symptoms which he treats with tamsulosin.  Ambulating normally in office.  He does report occasional scuffing of the feet since wearing his new sneakers.  Time spent reviewing results.  Given he does not have clinical symptoms of NPH there would be no reason to refer him for high-volume tap.  Will continue to monitor for new neurological symptoms.  He will contact the office prior to follow-up should he develop difficulty with gait,  troubling urinary issues, or cognition.    With regard to tremor we will increase primidone 50mg 1 tab in the am  and 2 tab at bedtime.  If ineffective we can try increasing to 2 tabs twicw daily as long as it is tolerated.

## 2024-08-26 NOTE — PATIENT INSTRUCTIONS
Mild L>R right action tremor with subtle decrement on finger taps. Exam suggestive of mild essential tremor.     Will try increasing primidone 50mg 1 tab in the am  and 2 tab at bedtime.  If ineffective we can try increasing to 2 tabs twicw daily as long as it is tolerated.      Mri brain reviewed- showed microvascular changes and changes that can be seen with ihose with a condition called normal pressure hydrocephalus. At this time there are no signs or symptoms of NPH therefore no further testing ordered. Will monitor for changes such as worsening gait, urinary issues or cognitive changes,

## 2024-08-26 NOTE — PROGRESS NOTES
Patient ID: Louis Aguilar is a 73 y.o. male    Assessment/Plan:    Tremor, essential  Mild L>R right action tremor with subtle decrement on finger taps. Exam suggestive of mild essential tremor.  He is currently on primidone 100 mg nightly with out clear improvement in tremor.  Avoided beta-blockers given potential bradycardia.  We avoided topiramate given ocular hypertension question of glaucoma.    Mri brain reviewed- with asymmetric enlargement of the lateral ventricles and altered signal which could be white matter changes or possibly related to interstitial fluid associated with no pressure hydrocephalus.  Clinically his exam is not suggestive of normal pressure hydrocephalus.  He is has no cognitive difficulties.  He has urinary symptoms which he treats with tamsulosin.  Ambulating normally in office.  He does report occasional scuffing of the feet since wearing his new sneakers.  Time spent reviewing results.  Given he does not have clinical symptoms of NPH there would be no reason to refer him for high-volume tap.  Will continue to monitor for new neurological symptoms.  He will contact the office prior to follow-up should he develop difficulty with gait,  troubling urinary issues, or cognition.    With regard to tremor we will increase primidone 50mg 1 tab in the am  and 2 tab at bedtime.  If ineffective we can try increasing to 2 tabs twicw daily as long as it is tolerated.        Diagnoses and all orders for this visit:    Tremor, essential  -     primidone (MYSOLINE) 50 mg tablet; 1 po qam and 2 po qhs    MRI of brain abnormal        Subjective:      Louis Aguilar is a 73 year old with essential tremor who presents for movement follow up.   Bilateral action hand tremor present since 2016. Denies ever having any rest tremor, loss of dexterity or changes in gait. First seen May 2024 on ropinirole with subtle left postural tremor with hand outstretched, bilateral intentional tremor, perhaps mild decrement  on finger taps. More suggestive of ET than PD.     He was to taper off ropinirole as he is not sure if this ever provided any benefit and was started on primidone. Topiramate avoided given possible glaucoma.   Beta blockers given potential for bradycardia.     On primidone 50mg 2 tab nghtly. He may have noted mild initial improvement now tremor back to baseline.  Tremors continue to be present bilaterally, L>R. Present when picking up coffee or eating.  No rest tremor note. No head, leg or vocal tremor.   Handwriting is okay.     No speech changes.   Legs will get stiff at times when walking.  He will scuff his feet at times.   No difficulty chewing and swallowing.   Dressing independently.  Hygiene acts performed independently without difficulty  Sleeps well.   There have been no symptoms of REM sleep behavior disorder. His feet occasionally jerk when dosing off.              Objective:    /70 (BP Location: Left arm, Patient Position: Sitting, Cuff Size: Large)   Pulse 61   Wt 87.6 kg (193 lb 3.2 oz)   BMI 34.22 kg/m²       Physical Exam  Vitals reviewed.   Eyes:      Extraocular Movements: Extraocular movements intact.   Neurological:      Motor: Motor strength is normal.  Psychiatric:         Speech: Speech normal.         Neurological Exam  Mental Status   Oriented to person, place, time and situation. Recent and remote memory are intact. Speech is normal. Follows complex commands. Attention and concentration are normal.    Cranial Nerves  CN III, IV, VI: Extraocular movements intact bilaterally.   Right pupil: 1 mm.   Left pupil: 1 mm.  CN VII: Full and symmetric facial movement.  CN VIII: Hearing is normal.  CN IX, X: Palate elevates symmetrically  CN XI: Shoulder shrug strength is normal.  CN XII: Tongue midline without atrophy or fasciculations.    Motor   Normal muscle tone. Strength is 5/5 throughout all four extremities.    Coordination      Subtle left postural tremor with hand outstretched.  1  Bilateral intentional tremor on FTN L>R 2,1   No head, leg or vocal tremor.   Spirals with mild tremor 1,1      Handwriting normal.  No rest tremor.  mild decrement on finger taps   Normal HG, KIKI HT  .    Gait  Casual gait is normal including stance, stride, and arm swing.  Normal speed.  No freezing or festination.  Good clearance..    MRI brain - 6/19/2024       Altered signal involving white matter described above is a nonspecific finding often related to chronic small vessel white matter ischemic disease but in this instance could relate in part to increased interstitial fluid associated with normal pressure   hydrocephalus as there is asymmetric enlargement of lateral and 3rd ventricles relative to size of cortical sulci at the convexities.  Evens index 0.39; normal range up to 0.3.     Mild mucosal thickening bilateral maxillary and ethmoidal sinuses.              Laurita Soto MD  Movement disorder physician  [facility]

## 2024-09-03 ENCOUNTER — TELEPHONE (OUTPATIENT)
Dept: GERIATRICS | Facility: OTHER | Age: 74
End: 2024-09-03

## 2024-09-03 NOTE — TELEPHONE ENCOUNTER
Carlos needs papers filled out for him to use the gym downstairs. I do have the form for you. I just don't know if it needs to have an appointment to it or not.

## 2024-09-20 ENCOUNTER — OFFICE VISIT (OUTPATIENT)
Dept: GERIATRICS | Facility: OTHER | Age: 74
End: 2024-09-20
Payer: COMMERCIAL

## 2024-09-20 VITALS
TEMPERATURE: 98.2 F | DIASTOLIC BLOOD PRESSURE: 70 MMHG | RESPIRATION RATE: 15 BRPM | WEIGHT: 191.8 LBS | OXYGEN SATURATION: 98 % | BODY MASS INDEX: 33.98 KG/M2 | HEART RATE: 66 BPM | HEIGHT: 63 IN | SYSTOLIC BLOOD PRESSURE: 130 MMHG

## 2024-09-20 DIAGNOSIS — E66.09 CLASS 1 OBESITY DUE TO EXCESS CALORIES WITHOUT SERIOUS COMORBIDITY WITH BODY MASS INDEX (BMI) OF 33.0 TO 33.9 IN ADULT: ICD-10-CM

## 2024-09-20 DIAGNOSIS — H61.23 BILATERAL HEARING LOSS DUE TO CERUMEN IMPACTION: Primary | ICD-10-CM

## 2024-09-20 DIAGNOSIS — G25.0 TREMOR, ESSENTIAL: ICD-10-CM

## 2024-09-20 DIAGNOSIS — E78.2 MIXED HYPERLIPIDEMIA: ICD-10-CM

## 2024-09-20 DIAGNOSIS — R97.20 BPH WITH ELEVATED PSA: ICD-10-CM

## 2024-09-20 DIAGNOSIS — N40.0 BPH WITH ELEVATED PSA: ICD-10-CM

## 2024-09-20 PROCEDURE — 69210 REMOVE IMPACTED EAR WAX UNI: CPT | Performed by: NURSE PRACTITIONER

## 2024-09-20 PROCEDURE — 99214 OFFICE O/P EST MOD 30 MIN: CPT | Performed by: NURSE PRACTITIONER

## 2024-09-20 NOTE — PROGRESS NOTES
[facility] at [address]  [address]  [phone]    Older Adult Primary Care    ASSESSMENT AND PLAN:  1. Bilateral hearing loss due to cerumen impaction  Assessment & Plan:  Bilateral ear lavage performed today with no complication  2. Tremor, essential  Assessment & Plan:  Mild essential tremor left > right  Primidone increased to 50 mg tab in the am and 2 tabs at bedtime with no reported side effects.   MRI showed asymmetric enlargement of lateral ventricles and altered signal with possible white matter changes or interstitial fluid associated with NPH. Examination not suggestive of NPH at Neurology visit.   Patient states no change in tremor with increased dose.  No tremor on examination  Will consider increasing primidone to 2 tabs twice daily per Neurology recommendation if above change ineffective.  3. Class 1 obesity due to excess calories without serious comorbidity with body mass index (BMI) of 33.0 to 33.9 in adult  Assessment & Plan:  Continue dietary and lifestyle modifications.  4. BPH with elevated PSA  Assessment & Plan:  Symptoms controlled with tamsulosin  Patient due for repeat PSA level prior to next f/u appt.  5. Mixed hyperlipidemia  Assessment & Plan:  Continue rosuvastatin  Lipid panel and TSH due prior to next follow-up appt.   Continue diet and lifestyle modifications      Name: Louis Aguilar                 : 1950               Sex: male    HPI:    73-year-old male patient seen and examined today for ear lavage and follow-up on chronic medical conditions. He is status post Neurology follow-up for tremors of hand and abnormal MRI.  His primidone was increased to one tablet in the am and 2 tabs at HS. He states that there is no change in tremors as of yet.     The following portions of the patient's history were reviewed and updated as appropriate: allergies, current medications, past family history, past medical history,  past social history, past surgical history and problem list.    ROS: Review of Systems   Constitutional:  Negative for activity change, appetite change, fatigue and fever.   HENT:  Positive for hearing loss.         D/t cerumen    Eyes:  Negative for discharge and visual disturbance.   Respiratory:  Positive for wheezing. Negative for cough, chest tightness and shortness of breath.         Occasional wheezing   Cardiovascular:  Negative for chest pain.   Gastrointestinal:  Negative for abdominal pain, constipation, diarrhea, nausea and vomiting.   Endocrine: Negative for polydipsia, polyphagia and polyuria.   Genitourinary:  Negative for difficulty urinating, dysuria and frequency.   Musculoskeletal:  Positive for arthralgias.        BL knee pain occasionally     Skin:  Negative for rash and wound.   Neurological:  Positive for tremors. Negative for dizziness, weakness, numbness and headaches.   Psychiatric/Behavioral:  Negative for agitation and behavioral problems. The patient is not nervous/anxious.        No Known Allergies    Medications:    Current Outpatient Medications on File Prior to Visit   Medication Sig Dispense Refill    latanoprost (XALATAN) 0.005 % ophthalmic solution Administer 1 drop to both eyes daily at bedtime      primidone (MYSOLINE) 50 mg tablet 1 po qam and 2 po qhs 270 tablet 2    Rosuvastatin Calcium 20 MG CPSP Take 20 mg by mouth daily      TAMSULOSIN HCL PO Take 0.4 mg by mouth daily       No current facility-administered medications on file prior to visit.       History:  Past Medical History:   Diagnosis Date    Colon polyps     Hyperlipemia     Ocular hypertension     Thrombocytopenia (HCC)      Past Surgical History:   Procedure Laterality Date    SHOULDER ARTHROSCOPY Right      Family History   Problem Relation Age of Onset    Cancer Mother         colon cancer     Social History     Socioeconomic History    Marital status:      Spouse name: Not on file    Number of children:  "Not on file    Years of education: Not on file    Highest education level: Not on file   Occupational History    Not on file   Tobacco Use    Smoking status: Former     Types: Cigarettes     Passive exposure: Past (Quit smoking 40 years ago)    Smokeless tobacco: Never   Substance and Sexual Activity    Alcohol use: Not on file    Drug use: Not on file    Sexual activity: Not on file   Other Topics Concern    Not on file   Social History Narrative    Not on file     Social Determinants of Health     Financial Resource Strain: Not on file   Food Insecurity: Not on file   Transportation Needs: Not on file   Physical Activity: Not on file   Stress: Not on file   Social Connections: Not on file   Intimate Partner Violence: Not on file   Housing Stability: Not on file     Past Surgical History:   Procedure Laterality Date    SHOULDER ARTHROSCOPY Right        OBJECTIVE:  Vitals:    09/20/24 0952   BP: 130/70   BP Location: Left arm   Patient Position: Sitting   Cuff Size: Standard   Pulse: 66   Resp: 15   Temp: 98.2 °F (36.8 °C)   TempSrc: Temporal   SpO2: 98%   Weight: 87 kg (191 lb 12.8 oz)   Height: 5' 3\" (1.6 m)     Body mass index is 33.98 kg/m².  Physical Exam  Constitutional:       General: He is not in acute distress.     Appearance: Normal appearance. He is not ill-appearing, toxic-appearing or diaphoretic.   HENT:      Head: Normocephalic and atraumatic.      Right Ear: There is impacted cerumen.      Left Ear: There is impacted cerumen.      Nose: Nose normal.      Mouth/Throat:      Mouth: Mucous membranes are moist.   Eyes:      Extraocular Movements: Extraocular movements intact.      Conjunctiva/sclera: Conjunctivae normal.   Cardiovascular:      Rate and Rhythm: Normal rate and regular rhythm.      Pulses: Normal pulses.   Pulmonary:      Effort: Pulmonary effort is normal. No respiratory distress.      Breath sounds: Normal breath sounds. No wheezing, rhonchi or rales.   Abdominal:      General: Bowel " sounds are normal. There is no distension.      Palpations: Abdomen is soft. There is no mass.      Tenderness: There is no abdominal tenderness. There is no guarding.   Musculoskeletal:         General: Normal range of motion.   Skin:     General: Skin is warm and dry.   Neurological:      General: No focal deficit present.      Mental Status: He is alert and oriented to person, place, and time. Mental status is at baseline.      Motor: No weakness.      Gait: Gait normal.   Psychiatric:         Mood and Affect: Mood normal.         Behavior: Behavior normal.         Thought Content: Thought content normal.         Labs & Imaging Reviewed in EMR.    CARLOS ENRIQUE Ray  Geriatric Medicine  09/20/2024

## 2024-09-21 NOTE — ASSESSMENT & PLAN NOTE
Mild essential tremor left > right  Primidone increased to 50 mg tab in the am and 2 tabs at bedtime with no reported side effects.   MRI showed asymmetric enlargement of lateral ventricles and altered signal with possible white matter changes or interstitial fluid associated with NPH. Examination not suggestive of NPH at Neurology visit.   Patient states no change in tremor with increased dose.  No tremor on examination  Will consider increasing primidone to 2 tabs twice daily per Neurology recommendation if above change ineffective.

## 2024-09-21 NOTE — ASSESSMENT & PLAN NOTE
Continue rosuvastatin  Lipid panel and TSH due prior to next follow-up appt.   Continue diet and lifestyle modifications

## 2024-09-21 NOTE — PROGRESS NOTES
Ear cerumen removal    Date/Time: 9/20/2024 10:00 AM    Performed by: CARLOS ENRIQUE Ge  Authorized by: CARLOS ENRIQUE Ge  Universal Protocol:  Procedure performed by: (Mook Casas RN, CARLOS ENRIQUE student and VANESSA Ochoa student)  Consent given by: patient  Patient understanding: patient states understanding of the procedure being performed  Patient identity confirmed: verbally with patient    Patient location:  Clinic  Indications / Diagnosis:  Hearing loss due to bilateral cerumen impaction  Procedure details:     Location:  L ear and R ear    Procedure type: irrigation with instrumentation      Instrumentation: curette      Approach:  Internal    Equipment used:  Water pik, curette, otoscope  Post-procedure details:     Complication:  None    Hearing quality:  Improved    [facility]  Older Adult Primary Care

## 2024-10-14 ENCOUNTER — APPOINTMENT (OUTPATIENT)
Dept: LAB | Facility: CLINIC | Age: 74
End: 2024-10-14
Payer: COMMERCIAL

## 2024-10-14 DIAGNOSIS — E78.2 MIXED HYPERLIPIDEMIA: ICD-10-CM

## 2024-10-14 DIAGNOSIS — N40.0 BPH WITH ELEVATED PSA: ICD-10-CM

## 2024-10-14 DIAGNOSIS — G25.0 TREMOR, ESSENTIAL: ICD-10-CM

## 2024-10-14 DIAGNOSIS — R97.20 BPH WITH ELEVATED PSA: ICD-10-CM

## 2024-10-14 DIAGNOSIS — D69.6 THROMBOCYTOPENIA (HCC): ICD-10-CM

## 2024-10-14 LAB
ALBUMIN SERPL BCG-MCNC: 4.4 G/DL (ref 3.5–5)
ALP SERPL-CCNC: 109 U/L (ref 34–104)
ALT SERPL W P-5'-P-CCNC: 20 U/L (ref 7–52)
ANION GAP SERPL CALCULATED.3IONS-SCNC: 8 MMOL/L (ref 4–13)
AST SERPL W P-5'-P-CCNC: 15 U/L (ref 13–39)
BASOPHILS # BLD AUTO: 0.04 THOUSANDS/ΜL (ref 0–0.1)
BASOPHILS NFR BLD AUTO: 1 % (ref 0–1)
BILIRUB SERPL-MCNC: 0.77 MG/DL (ref 0.2–1)
BUN SERPL-MCNC: 18 MG/DL (ref 5–25)
CALCIUM SERPL-MCNC: 9.4 MG/DL (ref 8.4–10.2)
CHLORIDE SERPL-SCNC: 103 MMOL/L (ref 96–108)
CHOLEST SERPL-MCNC: 205 MG/DL
CO2 SERPL-SCNC: 26 MMOL/L (ref 21–32)
CREAT SERPL-MCNC: 1.08 MG/DL (ref 0.6–1.3)
EOSINOPHIL # BLD AUTO: 0.12 THOUSAND/ΜL (ref 0–0.61)
EOSINOPHIL NFR BLD AUTO: 2 % (ref 0–6)
ERYTHROCYTE [DISTWIDTH] IN BLOOD BY AUTOMATED COUNT: 13 % (ref 11.6–15.1)
FOLATE SERPL-MCNC: 10.2 NG/ML
GFR SERPL CREATININE-BSD FRML MDRD: 67 ML/MIN/1.73SQ M
GLUCOSE P FAST SERPL-MCNC: 91 MG/DL (ref 65–99)
HCT VFR BLD AUTO: 49.1 % (ref 36.5–49.3)
HDLC SERPL-MCNC: 56 MG/DL
HGB BLD-MCNC: 16.7 G/DL (ref 12–17)
IMM GRANULOCYTES # BLD AUTO: 0.02 THOUSAND/UL (ref 0–0.2)
IMM GRANULOCYTES NFR BLD AUTO: 0 % (ref 0–2)
LDLC SERPL CALC-MCNC: 128 MG/DL (ref 0–100)
LYMPHOCYTES # BLD AUTO: 1.28 THOUSANDS/ΜL (ref 0.6–4.47)
LYMPHOCYTES NFR BLD AUTO: 25 % (ref 14–44)
MCH RBC QN AUTO: 33.3 PG (ref 26.8–34.3)
MCHC RBC AUTO-ENTMCNC: 34 G/DL (ref 31.4–37.4)
MCV RBC AUTO: 98 FL (ref 82–98)
MONOCYTES # BLD AUTO: 0.44 THOUSAND/ΜL (ref 0.17–1.22)
MONOCYTES NFR BLD AUTO: 9 % (ref 4–12)
NEUTROPHILS # BLD AUTO: 3.2 THOUSANDS/ΜL (ref 1.85–7.62)
NEUTS SEG NFR BLD AUTO: 63 % (ref 43–75)
NONHDLC SERPL-MCNC: 149 MG/DL
NRBC BLD AUTO-RTO: 0 /100 WBCS
PLATELET # BLD AUTO: 90 THOUSANDS/UL (ref 149–390)
PLATELET BLD QL SMEAR: ABNORMAL
PMV BLD AUTO: 13.6 FL (ref 8.9–12.7)
POTASSIUM SERPL-SCNC: 4.3 MMOL/L (ref 3.5–5.3)
PROT SERPL-MCNC: 6.6 G/DL (ref 6.4–8.4)
RBC # BLD AUTO: 5.02 MILLION/UL (ref 3.88–5.62)
RBC MORPH BLD: NORMAL
SODIUM SERPL-SCNC: 137 MMOL/L (ref 135–147)
TRIGL SERPL-MCNC: 103 MG/DL
TSH SERPL DL<=0.05 MIU/L-ACNC: 2.84 UIU/ML (ref 0.45–4.5)
VIT B12 SERPL-MCNC: 187 PG/ML (ref 180–914)
WBC # BLD AUTO: 5.1 THOUSAND/UL (ref 4.31–10.16)

## 2024-10-14 PROCEDURE — 85025 COMPLETE CBC W/AUTO DIFF WBC: CPT

## 2024-10-14 PROCEDURE — 82607 VITAMIN B-12: CPT

## 2024-10-14 PROCEDURE — 84153 ASSAY OF PSA TOTAL: CPT

## 2024-10-14 PROCEDURE — 36415 COLL VENOUS BLD VENIPUNCTURE: CPT

## 2024-10-14 PROCEDURE — 80053 COMPREHEN METABOLIC PANEL: CPT

## 2024-10-14 PROCEDURE — 84443 ASSAY THYROID STIM HORMONE: CPT

## 2024-10-14 PROCEDURE — 80061 LIPID PANEL: CPT

## 2024-10-14 PROCEDURE — 82746 ASSAY OF FOLIC ACID SERUM: CPT

## 2024-10-18 LAB — MISCELLANEOUS LAB TEST RESULT: NORMAL

## 2024-10-20 PROBLEM — H61.23 BILATERAL HEARING LOSS DUE TO CERUMEN IMPACTION: Status: RESOLVED | Noted: 2024-04-23 | Resolved: 2024-10-20

## 2024-10-28 ENCOUNTER — TELEPHONE (OUTPATIENT)
Age: 74
End: 2024-10-28

## 2024-10-28 NOTE — TELEPHONE ENCOUNTER
Called Carlos to let him know that Dr. Patel is sick and that she will be unable to see him tomorrow. Our Nurse Practitioner Hanna will be taking over the schedule and will see him instead. I let him know to give the office a call back if he needed to reschedule or had any other questions or concerns.

## 2024-10-29 ENCOUNTER — OFFICE VISIT (OUTPATIENT)
Dept: GERIATRICS | Facility: OTHER | Age: 74
End: 2024-10-29
Payer: COMMERCIAL

## 2024-10-29 VITALS
BODY MASS INDEX: 33.84 KG/M2 | OXYGEN SATURATION: 98 % | HEART RATE: 59 BPM | TEMPERATURE: 97.5 F | HEIGHT: 63 IN | WEIGHT: 191 LBS | DIASTOLIC BLOOD PRESSURE: 70 MMHG | SYSTOLIC BLOOD PRESSURE: 132 MMHG

## 2024-10-29 DIAGNOSIS — R97.20 BPH WITH ELEVATED PSA: Primary | ICD-10-CM

## 2024-10-29 DIAGNOSIS — D69.6 THROMBOCYTOPENIA (HCC): ICD-10-CM

## 2024-10-29 DIAGNOSIS — E53.8 VITAMIN B12 DEFICIENCY: ICD-10-CM

## 2024-10-29 DIAGNOSIS — N40.0 BPH WITH ELEVATED PSA: Primary | ICD-10-CM

## 2024-10-29 DIAGNOSIS — E66.09 CLASS 1 OBESITY DUE TO EXCESS CALORIES WITHOUT SERIOUS COMORBIDITY WITH BODY MASS INDEX (BMI) OF 33.0 TO 33.9 IN ADULT: ICD-10-CM

## 2024-10-29 DIAGNOSIS — E66.811 CLASS 1 OBESITY DUE TO EXCESS CALORIES WITHOUT SERIOUS COMORBIDITY WITH BODY MASS INDEX (BMI) OF 33.0 TO 33.9 IN ADULT: ICD-10-CM

## 2024-10-29 DIAGNOSIS — G25.0 TREMOR, ESSENTIAL: ICD-10-CM

## 2024-10-29 DIAGNOSIS — E78.2 MIXED HYPERLIPIDEMIA: ICD-10-CM

## 2024-10-29 PROCEDURE — 99214 OFFICE O/P EST MOD 30 MIN: CPT | Performed by: NURSE PRACTITIONER

## 2024-10-29 RX ORDER — LANOLIN ALCOHOL/MO/W.PET/CERES
1000 CREAM (GRAM) TOPICAL DAILY
Start: 2024-10-29

## 2024-10-29 RX ORDER — TIMOLOL MALEATE 5 MG/ML
1 SOLUTION/ DROPS OPHTHALMIC 2 TIMES DAILY
COMMUNITY
Start: 2024-10-23

## 2024-10-29 RX ORDER — TAMSULOSIN HYDROCHLORIDE 0.4 MG/1
0.4 CAPSULE ORAL DAILY
Qty: 90 CAPSULE | Refills: 2 | Status: SHIPPED | OUTPATIENT
Start: 2024-10-29

## 2024-10-29 NOTE — PROGRESS NOTES
[facility] at [address]  [place], [place]  [id number] [phone]    Older Adult Primary Care    ASSESSMENT AND PLAN:  1. BPH with elevated PSA  Assessment & Plan:  Symptoms controlled with tamsulosin  PSA stable on 10/14/2024 at 2.9  Orders:  -     tamsulosin (FLOMAX) 0.4 mg; Take 1 capsule (0.4 mg total) by mouth daily  2. Vitamin B12 deficiency  Assessment & Plan:  Vitamin B-12 low at 187 on recent blood work  Start vitamin B-12 1000 mcg po daily  Orders:  -     vitamin B-12 (VITAMIN B-12) 1,000 mcg tablet; Take 1 tablet (1,000 mcg total) by mouth daily  3. Class 1 obesity due to excess calories without serious comorbidity with body mass index (BMI) of 33.0 to 33.9 in adult  Assessment & Plan:  BMI 33.83 kg/m2  Will consult weight management  Continue dietary and lifestyle modifications.  Orders:  -     Ambulatory Referral to Weight Management; Future  4. Tremor, essential  Assessment & Plan:  Mild essential tremor left > right  Primidone increased to 50 mg tab in the am and 2 tabs at bedtime with no reported side effects.   MRI showed asymmetric enlargement of lateral ventricles and altered signal with possible white matter changes or interstitial fluid associated with NPH. Examination not suggestive of NPH at Neurology visit.   Patient states no change in tremor with increased dose but not worsening.   No tremor on examination.  Start vitamin B-12 1000 mcg po daily  Follow-up with Neurology as scheduled  5. Mixed hyperlipidemia  Assessment & Plan:  Cholesterol 205,  on lipid panel dated 10/14/2024  Continue rosuvastatin  Continue diet and lifestyle modifications.     Orders:  -     Rosuvastatin Calcium 20 MG CPSP; Take 20 mg by mouth daily  6. Thrombocytopenia (HCC)  Assessment & Plan:  Chronic, idiopathic per history  Baseline platelet level reported to be 90s to 100s  Platelet count currently 90  B-12 level currently low at 187  Patient to start vitamin  B-12 1000 mcg po daily.         Name: Louis Aguilar                 : 1950               Sex: male    HPI:   73-year-old male patient seen and examined today to follow-up on acute and chronic medical conditions.  He states today that he is concerned about his weight. He started going to the gym but his joints are starting to hurt. He's been doing stretching exercises and using the bike machine. He follows with Neurology due to mild essential tremors of upper extremities right > left. He states that the tremors are not worse but has not gotten better either.     The following portions of the patient's history were reviewed and updated as appropriate: allergies, current medications, past family history, past medical history, past social history, past surgical history and problem list.    ROS: Review of Systems   Constitutional:  Positive for fatigue. Negative for appetite change and fever.   HENT:  Positive for hearing loss and rhinorrhea. Negative for congestion, postnasal drip, sinus pressure and sinus pain.         Runny nose in am.    Eyes:  Positive for visual disturbance.        Corrected astigmatism with recent new script   Respiratory:  Negative for cough, shortness of breath and wheezing.    Cardiovascular:  Negative for chest pain.   Gastrointestinal:  Negative for abdominal pain, constipation, diarrhea, nausea and vomiting.   Genitourinary:  Negative for difficulty urinating and dysuria.   Musculoskeletal:  Positive for arthralgias. Negative for gait problem, joint swelling, myalgias, neck pain and neck stiffness.        Joint pain with exercise   Skin:  Negative for rash and wound.   Neurological:  Positive for weakness and headaches. Negative for dizziness.        Occasional headaches  Chronic left arm weakness.    Psychiatric/Behavioral:  Negative for sleep disturbance.        No Known Allergies    Medications:    Current Outpatient Medications on File Prior to Visit   Medication Sig Dispense  "Refill    latanoprost (XALATAN) 0.005 % ophthalmic solution Administer 1 drop to both eyes daily at bedtime      primidone (MYSOLINE) 50 mg tablet 1 po qam and 2 po qhs 270 tablet 2    timolol (TIMOPTIC) 0.5 % ophthalmic solution Administer 1 drop to the right eye 2 (two) times a day       No current facility-administered medications on file prior to visit.       History:  Past Medical History:   Diagnosis Date    Colon polyps     Hyperlipemia     Ocular hypertension     Thrombocytopenia (HCC)      Past Surgical History:   Procedure Laterality Date    SHOULDER ARTHROSCOPY Right      Family History   Problem Relation Age of Onset    Cancer Mother         colon cancer     Social History     Socioeconomic History    Marital status:      Spouse name: Not on file    Number of children: Not on file    Years of education: Not on file    Highest education level: Not on file   Occupational History    Not on file   Tobacco Use    Smoking status: Former     Types: Cigarettes     Passive exposure: Past (Quit smoking 40 years ago)    Smokeless tobacco: Never   Substance and Sexual Activity    Alcohol use: Not on file    Drug use: Not on file    Sexual activity: Not on file   Other Topics Concern    Not on file   Social History Narrative    Not on file     Social Determinants of Health     Financial Resource Strain: Not on file   Food Insecurity: Not on file   Transportation Needs: Not on file   Physical Activity: Not on file   Stress: Not on file   Social Connections: Not on file   Intimate Partner Violence: Not on file   Housing Stability: Not on file     Past Surgical History:   Procedure Laterality Date    SHOULDER ARTHROSCOPY Right        OBJECTIVE:  Vitals:    10/29/24 1049   BP: 132/70   BP Location: Left arm   Patient Position: Sitting   Cuff Size: Standard   Pulse: 59   Temp: 97.5 °F (36.4 °C)   TempSrc: Temporal   SpO2: 98%   Weight: 86.6 kg (191 lb)   Height: 5' 3\" (1.6 m)     Body mass index is 33.83 " "kg/m².  Physical Exam  Constitutional:       General: He is not in acute distress.     Appearance: Normal appearance. He is not ill-appearing, toxic-appearing or diaphoretic.   HENT:      Head: Normocephalic and atraumatic.      Right Ear: Tympanic membrane, ear canal and external ear normal. There is no impacted cerumen.      Left Ear: Tympanic membrane, ear canal and external ear normal. There is no impacted cerumen.      Ears:      Comments: Excessive cerumen left EAC, small amount of cerumen right EAC.   Neurological:      Mental Status: He is alert.       Labs & Imaging:  Lab Results   Component Value Date    WBC 5.10 10/14/2024    HGB 16.7 10/14/2024    HCT 49.1 10/14/2024    MCV 98 10/14/2024    PLT 90 (L) 10/14/2024     Lab Results   Component Value Date    SODIUM 137 10/14/2024    K 4.3 10/14/2024     10/14/2024    CO2 26 10/14/2024    BUN 18 10/14/2024    CREATININE 1.08 10/14/2024    CALCIUM 9.4 10/14/2024     Lab Results   Component Value Date    MZEOAFOT52 187 10/14/2024     Lab Results   Component Value Date    SRD8QMTSUFOL 2.841 10/14/2024     No results found for: \"RDXE24HXIBHM\", \"[id number]\"     CARLOS ENRIQUE Ray  Geriatric Medicine  10/29/2024  "

## 2024-10-29 NOTE — PATIENT INSTRUCTIONS
Start vitamin B-12, 1000 mcg tablet daily  Weight management consult placed  Continue heart healthy diet and regular exercise  Follow-up in one month for ear lavage. Use over the counter debrox as directed one week prior to visit.

## 2024-11-05 DIAGNOSIS — E78.2 MIXED HYPERLIPIDEMIA: Primary | ICD-10-CM

## 2024-11-05 PROBLEM — E53.8 VITAMIN B12 DEFICIENCY: Status: ACTIVE | Noted: 2024-11-05

## 2024-11-05 RX ORDER — ROSUVASTATIN CALCIUM 20 MG/1
20 TABLET, COATED ORAL DAILY
Qty: 90 TABLET | Refills: 2 | Status: SHIPPED | OUTPATIENT
Start: 2024-11-05

## 2024-11-05 RX ORDER — ROSUVASTATIN CALCIUM 20 MG/1
20 TABLET, COATED ORAL DAILY
Qty: 90 TABLET | Refills: 2 | Status: SHIPPED | OUTPATIENT
Start: 2024-11-05 | End: 2024-11-05 | Stop reason: SDUPTHER

## 2024-11-05 NOTE — ASSESSMENT & PLAN NOTE
Mild essential tremor left > right  Primidone increased to 50 mg tab in the am and 2 tabs at bedtime with no reported side effects.   MRI showed asymmetric enlargement of lateral ventricles and altered signal with possible white matter changes or interstitial fluid associated with NPH. Examination not suggestive of NPH at Neurology visit.   Patient states no change in tremor with increased dose but not worsening.   No tremor on examination.  Start vitamin B-12 1000 mcg po daily  Follow-up with Neurology as scheduled

## 2024-11-05 NOTE — ASSESSMENT & PLAN NOTE
Chronic, idiopathic per history  Baseline platelet level reported to be 90s to 100s  Platelet count currently 90  B-12 level currently low at 187  Patient to start vitamin B-12 1000 mcg po daily.

## 2024-11-05 NOTE — ASSESSMENT & PLAN NOTE
Cholesterol 205,  on lipid panel dated 10/14/2024  Continue rosuvastatin  Continue diet and lifestyle modifications.

## 2024-11-26 ENCOUNTER — OFFICE VISIT (OUTPATIENT)
Dept: GERIATRICS | Facility: OTHER | Age: 74
End: 2024-11-26
Payer: COMMERCIAL

## 2024-11-26 VITALS
TEMPERATURE: 98.1 F | HEIGHT: 63 IN | OXYGEN SATURATION: 96 % | HEART RATE: 56 BPM | SYSTOLIC BLOOD PRESSURE: 122 MMHG | WEIGHT: 195 LBS | BODY MASS INDEX: 34.55 KG/M2 | DIASTOLIC BLOOD PRESSURE: 80 MMHG

## 2024-11-26 DIAGNOSIS — H61.23 HEARING LOSS OF BOTH EARS DUE TO CERUMEN IMPACTION: Primary | ICD-10-CM

## 2024-11-26 PROCEDURE — 69210 REMOVE IMPACTED EAR WAX UNI: CPT | Performed by: NURSE PRACTITIONER

## 2024-11-26 NOTE — PROGRESS NOTES
[facility] at [address]  [address] [phone]    Older Adult Primary Care    ASSESSMENT AND PLAN:  {There are no diagnoses linked to this encounter. (Refresh or delete this SmartLink)}      Name: Louis Aguilar                 : 1950               Sex: male    HPI:  Patient evaluated today in SNF rehab to follow-up on acute and chronic medical conditions. Upon examination, patient is awake, alert and in no acute distress.  Refer to assessment and plan for further HPI. The following portions of the patient's history were reviewed and updated as appropriate: allergies, current medications, past family history, past medical history, past social history, past surgical history and problem list.    ROS: Review of Systems    No Known Allergies    Medications:    Current Outpatient Medications on File Prior to Visit   Medication Sig Dispense Refill   • latanoprost (XALATAN) 0.005 % ophthalmic solution Administer 1 drop to both eyes daily at bedtime     • primidone (MYSOLINE) 50 mg tablet 1 po qam and 2 po qhs 270 tablet 2   • rosuvastatin (CRESTOR) 20 MG tablet Take 1 tablet (20 mg total) by mouth daily 90 tablet 2   • tamsulosin (FLOMAX) 0.4 mg Take 1 capsule (0.4 mg total) by mouth daily 90 capsule 2   • timolol (TIMOPTIC) 0.5 % ophthalmic solution Administer 1 drop to the right eye 2 (two) times a day     • vitamin B-12 (VITAMIN B-12) 1,000 mcg tablet Take 1 tablet (1,000 mcg total) by mouth daily       No current facility-administered medications on file prior to visit.       History:  Past Medical History:   Diagnosis Date   • Colon polyps    • Hyperlipemia    • Ocular hypertension    • Thrombocytopenia (HCC)      Past Surgical History:   Procedure Laterality Date   • SHOULDER ARTHROSCOPY Right      Family History   Problem Relation Age of Onset   • Cancer Mother         colon cancer     Social History     Socioeconomic History   • Marital status:  "     Spouse name: Not on file   • Number of children: Not on file   • Years of education: Not on file   • Highest education level: Not on file   Occupational History   • Not on file   Tobacco Use   • Smoking status: Former     Types: Cigarettes     Passive exposure: Past (Quit smoking 40 years ago)   • Smokeless tobacco: Never   Substance and Sexual Activity   • Alcohol use: Not on file   • Drug use: Not on file   • Sexual activity: Not on file   Other Topics Concern   • Not on file   Social History Narrative   • Not on file     Social Drivers of Health     Financial Resource Strain: Not on file   Food Insecurity: Not on file   Transportation Needs: Not on file   Physical Activity: Not on file   Stress: Not on file   Social Connections: Not on file   Intimate Partner Violence: Not on file   Housing Stability: Not on file     Past Surgical History:   Procedure Laterality Date   • SHOULDER ARTHROSCOPY Right        OBJECTIVE:  Vitals:    11/26/24 0850   BP: 122/80   BP Location: Left arm   Patient Position: Sitting   Cuff Size: Standard   Pulse: 56   Temp: 98.1 °F (36.7 °C)   TempSrc: Temporal   SpO2: 96%   Weight: 88.5 kg (195 lb)   Height: 5' 3\" (1.6 m)     Body mass index is 34.54 kg/m².  Physical Exam    Labs & Imaging:  Lab Results   Component Value Date    WBC 5.10 10/14/2024    HGB 16.7 10/14/2024    HCT 49.1 10/14/2024    MCV 98 10/14/2024    PLT 90 (L) 10/14/2024     Lab Results   Component Value Date    SODIUM 137 10/14/2024    K 4.3 10/14/2024     10/14/2024    CO2 26 10/14/2024    BUN 18 10/14/2024    CREATININE 1.08 10/14/2024    CALCIUM 9.4 10/14/2024     Lab Results   Component Value Date    KNWYZHKL20 187 10/14/2024     Lab Results   Component Value Date    YWU1VPIQSHCW 2.841 10/14/2024     No results found for: \"BLAO31RUKOCV\", \"[id number]\"     CARLOS ENRIQUE Ray  Geriatric Medicine  "

## 2024-11-26 NOTE — PROGRESS NOTES
Ear cerumen removal    Date/Time: 11/26/2024 9:00 AM    Performed by: CARLOS ENRIQUE Ge  Authorized by: CARLOS ENRIQUE Ge  Universal Protocol:  procedure performed by consultantConsent: Verbal consent obtained.  Consent given by: patient  Patient understanding: patient states understanding of the procedure being performed  Patient identity confirmed: verbally with patient    Patient location:  Clinic  Indications / Diagnosis:  Bilateral cerumen impaction  Procedure details:     Local anesthetic:  None    Location:  L ear and R ear    Procedure type: irrigation with instrumentation      Instrumentation: curette      Approach:  Internal and natural orifice  Post-procedure details:     Complication:  None    Hearing quality:  Improved    Patient tolerance of procedure:  Tolerated well, no immediate complications    Older Adult Primary Care  [facility]

## 2024-12-06 ENCOUNTER — NURSE TRIAGE (OUTPATIENT)
Age: 74
End: 2024-12-06

## 2024-12-06 DIAGNOSIS — R09.81 NASAL CONGESTION: ICD-10-CM

## 2024-12-06 DIAGNOSIS — R05.1 ACUTE COUGH: Primary | ICD-10-CM

## 2024-12-06 RX ORDER — MOMETASONE FUROATE MONOHYDRATE 50 UG/1
1 SPRAY, METERED NASAL 2 TIMES DAILY
Qty: 17 G | Refills: 2 | Status: SHIPPED | OUTPATIENT
Start: 2024-12-06

## 2024-12-06 RX ORDER — DEXTROMETHORPHAN HBR AND GUAIFENESIN 5; 100 MG/5ML; MG/5ML
10 LIQUID ORAL EVERY 12 HOURS
Qty: 355 ML | Refills: 0 | Status: SHIPPED | OUTPATIENT
Start: 2024-12-06

## 2024-12-06 NOTE — TELEPHONE ENCOUNTER
Noted, returned patient call and sent cough medication and nasal spray to pharmacy. Patient to call on Monday if he is still not feeling well and I will see him in the office.  Thank you

## 2024-12-06 NOTE — TELEPHONE ENCOUNTER
"Pt called stating that he had been down to the wellness center at [facility] two times since Wednesday. He was tested for COVID both times and was negative. He states he is having nasal congestion, sore throat and headache. He lyle fever, body aches or chills. Educated patient on wearing a mask in public and to practice good hand hygiene. Advised him if he gets a fever or symptoms become worse to please call us back so we can further assist him.     Reason for Disposition   Does not meet COVID-19 EXPOSURE criteria BUT caller still concerned about COVID-19 EXPOSURE (e.g., living with someone who was exposed and who has no symptoms of COVID-19)    Answer Assessment - Initial Assessment Questions  1. COVID-19 EXPOSURE: \"Please describe how you were exposed to someone with a COVID-19 infection.\"      Within the assisted care community  2. PLACE of CONTACT: \"Where were you when you were exposed to COVID-19?\" (e.g., home, school, medical waiting room; which city?)      [facility]  3. TYPE of CONTACT: \"How much contact was there?\" (e.g., sitting next to, live in same house, work in same office, same building)      Unsure was told by the health center that he was exposed  4. DURATION of CONTACT: \"How long were you in contact with the COVID-19 patient?\" (e.g., a few seconds, passed by person, a few minutes, 15 minutes or longer, live with the patient)      unsure  5. DATE of CONTACT: \"When did you have contact with a COVID-19 patient?\" (e.g., hours, days ago)      unsure  6. MASK: \"Were you wearing a mask?\" \"Was the other person wearing a mask?\" Note: wearing a mask reduces the risk of an otherwise close contact.      Was not wearing a mask  7. SYMPTOMS: \"Do you have any symptoms?\" (e.g., fever, cough, breathing difficulty, loss of taste or smell)      Patient has cough, congestion, sore throat  8. COVID-19 VACCINE: \"Have you had the COVID-19 vaccine?\" If Yes, ask: \"When did you last get it?\"      Believes he " "did have it done but cannot remember when  9. PREGNANCY OR POSTPARTUM: \"Is there any chance you are pregnant?\" \"When was your last menstrual period?\" \"Did you deliver in the last 2 weeks?\"      NA  10. HIGH RISK: \"Do you have any heart or lung problems?\" (e.g., asthma, COPD, heart failure) \"Do you have a weak immune system or other risk factors?\" (e.g., HIV positive, chemotherapy, renal failure, diabetes mellitus, sickle cell anemia, obesity)        lyle    Protocols used: COVID-19 - Exposure-Adult-[place]    "

## 2024-12-28 DIAGNOSIS — R09.81 NASAL CONGESTION: ICD-10-CM

## 2024-12-28 RX ORDER — MOMETASONE FUROATE MONOHYDRATE 50 UG/1
SPRAY, METERED NASAL
Qty: 17 G | Refills: 1 | Status: SHIPPED | OUTPATIENT
Start: 2024-12-28

## 2024-12-31 ENCOUNTER — OFFICE VISIT (OUTPATIENT)
Dept: BARIATRICS | Facility: CLINIC | Age: 74
End: 2024-12-31
Payer: COMMERCIAL

## 2024-12-31 VITALS
DIASTOLIC BLOOD PRESSURE: 70 MMHG | WEIGHT: 194.2 LBS | HEART RATE: 57 BPM | OXYGEN SATURATION: 97 % | HEIGHT: 63 IN | SYSTOLIC BLOOD PRESSURE: 134 MMHG | BODY MASS INDEX: 34.41 KG/M2

## 2024-12-31 DIAGNOSIS — E66.811 CLASS 1 OBESITY DUE TO EXCESS CALORIES WITHOUT SERIOUS COMORBIDITY WITH BODY MASS INDEX (BMI) OF 34.0 TO 34.9 IN ADULT: ICD-10-CM

## 2024-12-31 DIAGNOSIS — E66.09 CLASS 1 OBESITY DUE TO EXCESS CALORIES WITHOUT SERIOUS COMORBIDITY WITH BODY MASS INDEX (BMI) OF 34.0 TO 34.9 IN ADULT: ICD-10-CM

## 2024-12-31 DIAGNOSIS — E66.811 CLASS 1 OBESITY DUE TO EXCESS CALORIES WITH SERIOUS COMORBIDITY AND BODY MASS INDEX (BMI) OF 34.0 TO 34.9 IN ADULT: Primary | ICD-10-CM

## 2024-12-31 DIAGNOSIS — E66.09 CLASS 1 OBESITY DUE TO EXCESS CALORIES WITH SERIOUS COMORBIDITY AND BODY MASS INDEX (BMI) OF 34.0 TO 34.9 IN ADULT: Primary | ICD-10-CM

## 2024-12-31 PROCEDURE — 99204 OFFICE O/P NEW MOD 45 MIN: CPT | Performed by: INTERNAL MEDICINE

## 2024-12-31 NOTE — PROGRESS NOTES
Assessment/Plan     Louis Aguilar is 74 y.o. year old male  who comes in for consultation for assistance with weight management.     - Discussed options of [facility]-Intensive Lifestyle Intervention Program, Very Low Calorie Diet-VLCD, and Conservative Program and the role of weight loss medications.  - Patient is interested in pursuing Conservative Program    Class 1 obesity due to excess calories with body mass index (BMI) of 34.0 to 34.9 in adult  - Patient extremely emotional and tearful throughout the visit.  I discussed with patient how his mental health struggles could contribute to a stress response and subsequent weight gain.  I strongly urged him to meet with our  so he can be referred to appropriate counseling resources.  He could also benefit from meeting a psychiatrist and starting on the appropriate antidepressant.  He denies any emotional eating but predominantly snacks and grazes.  I discussed with him avoiding skipping meals and meet with a nutritionist to substitute healthy snacks instead of processed food.  He would like to work with a nutritionist in the assisted living community he is at.  He also states that he has exercise classes and a fitness center available-however he has decreased his physical activity.  I encouraged this would be important even for his mental health and not specifically weight loss.    In terms of medications for weight loss the list was reviewed  -I requested patient to inquire with his insurance regarding coverage for injectable GLP-1 medication such as Wegovy Zepbound and Saxenda  -Patient has ocular hypertension on timolol eyedrops.  Would avoid phentermine Topamax and Wellbutrin for now.  -I discussed with patient that he can check with his ophthalmologist whether we could use Wellbutrin which could have a weight loss benefit and could also help with depression.  Would defer initiating  this agent unless clearance from ophthalmology provided for  suspected glaucoma  -At this point the only medication that could be initiated is metformin.  However given his significant mental health struggles and possible depression, I would want him to have support addressing these issues rather than initiating a weight loss medication.  Patient verbalized understanding.    Carlos was seen today for consult.    Diagnoses and all orders for this visit:    Class 1 obesity due to excess calories with serious comorbidity and body mass index (BMI) of 34.0 to 34.9 in adult    Class 1 obesity due to excess calories without serious comorbidity with body mass index (BMI) of 34.0 to 34.9 in adult  -     Ambulatory Referral to Weight Management          -In addition, please follow general recommendations below.          Return visit:  6-8 weeks        General Lifestyle recommendations:    Nutrition   -Avoid skipping meals. Avoid sugary beverages. At least 64oz of water daily.  Limit processed food, refined sugars and grain. Encourage  healthy choices for meals and snacks   -Focus on protein goals and non starchy fiber rich vegetables for satiety effect and to help support a calorie deficit.   - Emphasize portion control, well balanced macronutrient's (protein, carbohydrate, fat using MyPlate method )and adequate protein with each meal/snacks and distributing calories equally throughout the day along with.   -Advise starting the day with a protein breakfast   Behavioral/Stress   Food log via adolph or provided paper log (adolph options include www.gridCommnesspal.com, sparkpeople.com, loseit.com, calorieking.com, Switchfly). Encouraged mindful eating. Be sure to set aside time to eat, eat slowly, and savor your food. Consider meditation apps and/or taking a few minutes of mindfulness every AM. Understand the role of regarding the role of stress hormone cortisol in promoting weight gain and visceral fat accumulation. Weigh daily or atleast 2-3 times/ week  Physical Activity   Increase physical  "activity by 10 minutes daily. Gradually increase physical activity to a goal of 5 days per week for 30 minutes of MODERATE intensity ( should be able to pass the \"talk test\" but should not be able to sing. Target 150-300 minutes  PLUS 2 days per week of FULL BODY resistance training. Progression will be addressed at follow up visits. Encouraged contemplation regarding establishing a daily physical activity routine  - Resistance training along with increase protein intake is important to maintain and enhance metabolism  Sleep   Encourage sleep hygiene and importance of having adequate sleep duration at least > 6 hours to support response in weight loss efforts    Handouts provided :  THRIVE program at [facility]  MyPlate and food quality  Food log resources, phone adolph or paper journal  Antiobesity medications options     - Discussed at length and the role of weight loss medications and medication options   - Explained the importance of making lifestyle changes in addition to starting any anti-obesity medications if the  patient chooses.  -  Initial weight loss goal of 5-10% weight loss for improved health  - Weight loss can improve patient's co-morbid conditions and/or prevent weight-related complications.  - Weight is not at goal and patient has been unable to achieve a meaningful weight loss above 5% using various programs and tools for more than 6 months    Carlos was seen today for consult.    Diagnoses and all orders for this visit:    Class 1 obesity due to excess calories with serious comorbidity and body mass index (BMI) of 34.0 to 34.9 in adult    Class 1 obesity due to excess calories without serious comorbidity with body mass index (BMI) of 34.0 to 34.9 in adult  -     Ambulatory Referral to Weight Management                      Total time spent reviewing chart, interviewing patient, examining patient, discussing plan, answering all questions, and documentin min, with >50% face-to-face time spent " "counseling patient on nonsurgical interventions for the treatment of excess weight. Discussed in detail nonsurgical options including intensive lifestyle intervention program, very low-calorie diet program and conservative program.  Discussed the role of weight loss medications.  Counseled patient on diet behavior and exercise modification for weight loss.        History of present illness/ Weight history   Patient is extremely emotional during this visit.  He reports he has been gaining weight since the passing of his wife in 2022   Sep 2022 154 Sep 2023 176 Mar 2024 186.  He has become socially withdrawn tearful.  He was doing therapy/grief counseling but has not seen the counselor in well over a year.  Patient tearful at this visit and states \"I was suggested but did not follow through with seeking help for his mental health\".  He provided a list of the the dinner meals that he consumes at the assisted living facility he is at.  Patient very emotional so a complete weight history was not able to be obtained.  He denies any weight loss attempts.      Wt Readings from Last 30 Encounters:   12/31/24 88.1 kg (194 lb 3.2 oz)   11/26/24 88.5 kg (195 lb)   10/29/24 86.6 kg (191 lb)   09/20/24 87 kg (191 lb 12.8 oz)   08/26/24 87.6 kg (193 lb 3.2 oz)   07/26/24 87 kg (191 lb 12.8 oz)   05/24/24 85.3 kg (188 lb)   04/23/24 84.6 kg (186 lb 9.6 oz)               Lifestyle questionnaire       Diet recall:  B: Oatmeal cereal or smoothies  S:  L: skips  S: Chips  D: entree  S: chips     Frequency Eating out x/ week: None    Food behaviorsboredom eating and snacking/grazing large portions     Trouble area of the day: afternoon  dinner    Beverages  Water-- 8 oz    Caffeine/tea--decaff coffee and tea  oz   SSB -- occasional soda(regular)    Alcohol: 0 drinks/ week     Social History     Substance and Sexual Activity   Alcohol Use Yes    Alcohol/week: 2.0 standard drinks of alcohol    Types: 2 Glasses of wine per week      Social " History     Tobacco Use   Smoking Status Former    Current packs/day: 0.00    Average packs/day: 0.5 packs/day for 15.0 years (7.5 ttl pk-yrs)    Types: Cigarettes    Quit date: 1986    Years since quittin.6    Passive exposure: Past (Quit smoking 40 years ago)   Smokeless Tobacco Never      Social History     Substance and Sexual Activity   Drug Use Never         Physical Activity --has exercise classes stretching, gym and stationary but not participating    Sleep -- Stop bang: Score: 4/ 8  ; 10 PM- 9 PM hours of sleep per night    Occupation-retired worked for [employer]     Psycho social- lives alone           Start date: 2024   Intial weight : 194 lbs  Intial BMI: 34.4 kg/m2  Obesity Class: 30.0-34.9- Obesity Class I  Goal weight: 150 lbs    Waist circumference (inches): 45 Inches    Colonoscopy: Not on file       Medication considerations/contraindications     -Patient denies personal history of pancreatitis. Patient also denies personal and family history of medullary thyroid cancer, and multiple endocrine neoplasia type 2 (MEN 2 tumor). -Patient denies any history of kidney stones, seizures, or +glaucoma ocular hypertension, diabetic retinopathy, gall bladder disease, gastroparesis, hyperthyroidism.  -Denies Hx of CAD, PAD, palpitations, arrhythmia, uncontrolled hypertension  -Denies uncontrolled anxiety or depression, suicidal behavior or thinking , insomnia or sleep disturbance.         Past medical history/past surgical history       Previous notes and records have been reviewed.    The following portions of the patient's history were reviewed and updated as appropriate: allergies, current medications, past family history, past medical history, past social history, past surgical history, and problem list.    Past Medical History:   Diagnosis Date    Colon polyps     Glaucoma     Hyperlipemia     Ocular hypertension     Thrombocytopenia (HCC)          Past Surgical History:   Procedure Laterality  "Date    SHOULDER ARTHROSCOPY Right              Family History   Problem Relation Age of Onset    Cancer Mother         colon cancer    Dementia Mother             Objective     /70   Pulse 57   Ht 5' 3\" (1.6 m)   Wt 88.1 kg (194 lb 3.2 oz)   SpO2 97%   BMI 34.40 kg/m²     Review of Systems   Constitutional:  Negative for chills, fatigue and fever.   HENT:  Negative for ear pain and sore throat.    Eyes:  Negative for pain and visual disturbance.   Respiratory:  Negative for cough and shortness of breath.    Cardiovascular:  Negative for chest pain, palpitations and leg swelling.   Gastrointestinal:  Negative for abdominal pain, constipation, diarrhea, nausea and vomiting.   Genitourinary:  Negative for dysuria and hematuria.   Musculoskeletal:  Negative for arthralgias and back pain.   Skin:  Negative for color change and rash.   Neurological:  Negative for seizures, syncope and headaches.   Psychiatric/Behavioral:  + Tearful/emotional  All other systems reviewed and are negative.    Physical Exam  Vitals and nursing note reviewed.   Constitutional:       Appearance: Normal appearance.   HENT:      Head: Normocephalic.   Pulmonary:      Effort: Pulmonary effort is normal.   Neurological:      General: No focal deficit present.      Mental Status: He is alert and oriented to person, place, and time.   Psychiatric:         Mood and Affect: Mood normal.         Behavior: Behavior normal.         Thought Content: Thought content normal.         Judgment: Judgment normal.         Medications       Current Outpatient Medications:     latanoprost (XALATAN) 0.005 % ophthalmic solution, Administer 1 drop to both eyes daily at bedtime, Disp: , Rfl:     primidone (MYSOLINE) 50 mg tablet, 1 po qam and 2 po qhs, Disp: 270 tablet, Rfl: 2    rosuvastatin (CRESTOR) 20 MG tablet, Take 1 tablet (20 mg total) by mouth daily, Disp: 90 tablet, Rfl: 2    tamsulosin (FLOMAX) 0.4 mg, Take 1 capsule (0.4 mg total) by mouth " "daily, Disp: 90 capsule, Rfl: 2    timolol (TIMOPTIC) 0.5 % ophthalmic solution, Administer 1 drop to the right eye 2 (two) times a day, Disp: , Rfl:     vitamin B-12 (VITAMIN B-12) 1,000 mcg tablet, Take 1 tablet (1,000 mcg total) by mouth daily, Disp: , Rfl:     Dextromethorphan-guaiFENesin (Mucinex Fast-Max DM Max) 5-100 MG/5ML LIQD, Take 10 mL by mouth every 12 (twelve) hours, Disp: 355 mL, Rfl: 0    mometasone (NASONEX) 50 mcg/act nasal spray, SPRAY 1 SPRAY INTO EACH NOSTRIL TWICE A DAY (Patient not taking: Reported on 12/31/2024), Disp: 17 g, Rfl: 1           Labs and imaging     Recent labs and imaging have been personally reviewed.  Lab Results   Component Value Date    WBC 5.10 10/14/2024    HGB 16.7 10/14/2024    HCT 49.1 10/14/2024    MCV 98 10/14/2024    PLT 90 (L) 10/14/2024     Lab Results   Component Value Date    SODIUM 137 10/14/2024    K 4.3 10/14/2024     10/14/2024    CO2 26 10/14/2024    AGAP 8 10/14/2024    BUN 18 10/14/2024    CREATININE 1.08 10/14/2024    GLUF 91 10/14/2024    CALCIUM 9.4 10/14/2024    AST 15 10/14/2024    ALT 20 10/14/2024    ALKPHOS 109 (H) 10/14/2024    TP 6.6 10/14/2024    TBILI 0.77 10/14/2024    EGFR 67 10/14/2024     No results found for: \"HGBA1C\"  Lab Results   Component Value Date    ZEN7IKZRWDEV 2.841 10/14/2024     Lab Results   Component Value Date    CHOLESTEROL 205 (H) 10/14/2024     Lab Results   Component Value Date    HDL 56 10/14/2024     Lab Results   Component Value Date    TRIG 103 10/14/2024     Lab Results   Component Value Date    LDLCALC 128 (H) 10/14/2024                     "

## 2024-12-31 NOTE — ASSESSMENT & PLAN NOTE
- Patient extremely emotional and tearful throughout the visit.  I discussed with patient how his mental health struggles could contribute to a stress response and subsequent weight gain.  I strongly urged him to meet with our  so he can be referred to appropriate counseling resources.  He could also benefit from meeting a psychiatrist and starting on the appropriate antidepressant.  He denies any emotional eating but predominantly snacks and grazes.  I discussed with him avoiding skipping meals and meet with a nutritionist to substitute healthy snacks instead of processed food.  He would like to work with a nutritionist in the assisted living community he is at.  He also states that he has exercise classes and a fitness center available-however he has decreased his physical activity.  I encouraged this would be important even for his mental health and not specifically weight loss.    In terms of medications for weight loss the list was reviewed  -I requested patient to inquire with his insurance regarding coverage for injectable GLP-1 medication such as Wegovy Zepbound and Saxenda  -Patient has ocular hypertension on timolol eyedrops.  Would avoid phentermine Topamax and Wellbutrin for now.  -I discussed with patient that he can check with his ophthalmologist whether we could use Wellbutrin which could have a weight loss benefit and could also help with depression.  Would defer initiating  this agent unless clearance from ophthalmology provided for suspected glaucoma  -At this point the only medication that could be initiated is metformin.  However given his significant mental health struggles and possible depression, I would want him to have support addressing these issues rather than initiating a weight loss medication.  Patient verbalized understanding.

## 2025-01-11 DIAGNOSIS — R09.81 NASAL CONGESTION: ICD-10-CM

## 2025-01-12 RX ORDER — MOMETASONE FUROATE MONOHYDRATE 50 UG/1
SPRAY, METERED NASAL
Qty: 17 G | Refills: 3 | Status: SHIPPED | OUTPATIENT
Start: 2025-01-12 | End: 2025-01-14 | Stop reason: ALTCHOICE

## 2025-02-25 ENCOUNTER — OFFICE VISIT (OUTPATIENT)
Dept: NEUROLOGY | Facility: CLINIC | Age: 75
End: 2025-02-25
Payer: COMMERCIAL

## 2025-02-25 VITALS
DIASTOLIC BLOOD PRESSURE: 70 MMHG | BODY MASS INDEX: 33.63 KG/M2 | HEIGHT: 64 IN | TEMPERATURE: 97.8 F | WEIGHT: 197 LBS | OXYGEN SATURATION: 98 % | SYSTOLIC BLOOD PRESSURE: 120 MMHG | HEART RATE: 70 BPM

## 2025-02-25 DIAGNOSIS — G25.0 TREMOR, ESSENTIAL: ICD-10-CM

## 2025-02-25 DIAGNOSIS — R90.89 MRI OF BRAIN ABNORMAL: Primary | ICD-10-CM

## 2025-02-25 PROCEDURE — 99214 OFFICE O/P EST MOD 30 MIN: CPT | Performed by: PHYSICIAN ASSISTANT

## 2025-02-25 PROCEDURE — G2211 COMPLEX E/M VISIT ADD ON: HCPCS | Performed by: PHYSICIAN ASSISTANT

## 2025-02-25 RX ORDER — PRIMIDONE 50 MG/1
TABLET ORAL
Start: 2025-02-25

## 2025-02-25 NOTE — ASSESSMENT & PLAN NOTE
Patient with slowly progressive left greater than right action and postural hand tremor.  He has not noticed any significant improvement with low-dose primidone.  No side effects.  Unable to use beta-blockers due to bradycardia as well as topiramate due to possible glaucoma.    On exam he was noted to have left greater than right postural and action tremors in the hands, no clear parkinsonian symptoms.  History and exam remain consistent with his diagnosis of essential tremor.    We had a long discussion in regards to medication options.  At this time we will try and maximize his primidone to see if there is any added benefit with higher dosing.  He will start by taking primidone 50 mg 2 tabs twice a day for the next week.  If no improvement he can further increase to 2 tabs in the morning 3 tabs at night and then 3 tabs twice a day.  He will watch for any side effects including increased sedation.  If tremors remain we can consider increasing the dose further if tolerated.      Orders:    primidone (MYSOLINE) 50 mg tablet; Take 2tabs bid x 1 week, 2tabs qam, 3tabs qpm for a week then 3tabs bid

## 2025-02-25 NOTE — PATIENT INSTRUCTIONS
Will increase the dose to 2tabs in the AM and 2tabs in the PM for a week.  IF no improvement then increase to 2tabs in the AM and 3tabs in the PM for a week then 3tabs twice a day.

## 2025-02-25 NOTE — PROGRESS NOTES
Review of Systems   Constitutional:  Negative for appetite change, fatigue and fever.   HENT: Negative.  Negative for hearing loss, tinnitus, trouble swallowing and voice change.    Eyes: Negative.  Negative for photophobia, pain and visual disturbance.   Respiratory: Negative.  Negative for shortness of breath.    Cardiovascular: Negative.  Negative for palpitations.   Gastrointestinal: Negative.  Negative for nausea and vomiting.   Endocrine: Negative.  Negative for cold intolerance.   Genitourinary: Negative.  Negative for dysuria, frequency and urgency.   Musculoskeletal:  Negative for back pain, gait problem, myalgias, neck pain and neck stiffness.   Skin: Negative.  Negative for rash.   Allergic/Immunologic: Negative.    Neurological:  Positive for tremors (primadone isnt effective). Negative for dizziness, seizures, syncope, facial asymmetry, speech difficulty, weakness, light-headedness, numbness and headaches.   Hematological: Negative.  Does not bruise/bleed easily.   Psychiatric/Behavioral: Negative.  Negative for confusion, hallucinations and sleep disturbance.

## 2025-02-25 NOTE — ASSESSMENT & PLAN NOTE
Once again there are no concerning findings on exam to suggest NPH at this time.  He denies urinary incontinence, imbalance or significant cognitive decline.

## 2025-02-25 NOTE — PROGRESS NOTES
Name: Louis Aguilar      : 1950      MRN: [id number]  Encounter Provider: Brian Bear PA-C  Encounter Date: 2025   Encounter department: [facility] [place]  :  Assessment & Plan  Tremor, essential  Patient with slowly progressive left greater than right action and postural hand tremor.  He has not noticed any significant improvement with low-dose primidone.  No side effects.  Unable to use beta-blockers due to bradycardia as well as topiramate due to possible glaucoma.    On exam he was noted to have left greater than right postural and action tremors in the hands, no clear parkinsonian symptoms.  History and exam remain consistent with his diagnosis of essential tremor.    We had a long discussion in regards to medication options.  At this time we will try and maximize his primidone to see if there is any added benefit with higher dosing.  He will start by taking primidone 50 mg 2 tabs twice a day for the next week.  If no improvement he can further increase to 2 tabs in the morning 3 tabs at night and then 3 tabs twice a day.  He will watch for any side effects including increased sedation.  If tremors remain we can consider increasing the dose further if tolerated.      Orders:    primidone (MYSOLINE) 50 mg tablet; Take 2tabs bid x 1 week, 2tabs qam, 3tabs qpm for a week then 3tabs bid    MRI of brain abnormal  Once again there are no concerning findings on exam to suggest NPH at this time.  He denies urinary incontinence, imbalance or significant cognitive decline.               History of Present Illness   will contact the office prior to follow-up should he develop difficulty with gait,  troubling urinary issues, or cognition.    Louis Agiular is a 74 year old with essential tremor who presents for movement follow up.     Bilateral action hand tremor present since 2016. Denies ever having any rest tremor, loss of dexterity or changes in gait. First seen May 2024 on  "ropinirole with subtle left postural tremor with hand outstretched, bilateral intentional tremor, perhaps mild decrement on finger taps. More suggestive of ET than PD.     At his last visit his Primidone dose was increased.  MRI with some findings concerning for NPH however exam and history was not consistent with this.     INTERVAL HISTORY:  Tremors worse on the left, will notice it in the right at times as well   Some days worse than others   May struggle with eating, drinking  No real issues with writing given he uses the right hand   He can perform all of his ADLs on his own  He can shave, brush his teeth and dress on his own   No tremors when sleeping   No rest tremor   No family history of tremors   He feels knee stiffness at times, no real trouble with walking   No urinary issues   No real concerns with the cognition, he is still functioning without issues     Current medications:  Primidone 50mg 1tab qam, 2tabs qpm     Prior medications:  Topiramate - avoided due to possible glaucoma   Beta blockers - avoided given bradycardia    Ropinirole - no benefit        Review of Systems I have personally reviewed the MA's review of systems and made changes as necessary.         Objective   /70 (BP Location: Left arm, Patient Position: Sitting, Cuff Size: Adult)   Pulse 70   Temp 97.8 °F (36.6 °C) (Temporal)   Ht 5' 4\" (1.626 m)   Wt 89.4 kg (197 lb)   SpO2 98%   BMI 33.81 kg/m²     Physical Exam  Constitutional:       Appearance: Normal appearance.   HENT:      Right Ear: Hearing normal.      Left Ear: Hearing normal.   Eyes:      Extraocular Movements: Extraocular movements intact.   Pulmonary:      Effort: Pulmonary effort is normal.   Neurological:      Mental Status: He is alert.      Motor: Motor strength is normal.  Psychiatric:         Speech: Speech normal.       Neurological Exam  Mental Status  Alert. Oriented to person, place, time and situation. Recent and remote memory are intact. Speech is " normal. Follows complex commands. Attention and concentration are normal.    Cranial Nerves  CN III, IV, VI: Extraocular movements intact bilaterally.   Right pupil: 1 mm.   Left pupil: 1 mm.  CN VII: Full and symmetric facial movement.  CN VIII:  Right: Hearing is normal.  Left: Hearing is normal.  CN IX, X: Palate elevates symmetrically  CN XI: Shoulder shrug strength is normal.  CN XII: Tongue midline without atrophy or fasciculations.    Motor   Normal muscle tone. Strength is 5/5 throughout all four extremities.    Coordination    Mild left postural tremor with hand outstretched. 1  Very slight on the right   Bilateral intentional tremor on FTN L>R 2,1   Right greater than left wing beat tremor   No head, leg or vocal tremor.   No rest tremor.  Some generalized bradykinesia, no decrement   No rigidity   .    Gait  Casual gait is normal including stance, stride, and arm swing.  Normal speed.  No freezing or festination.  Good clearance..

## 2025-04-04 DIAGNOSIS — G25.0 TREMOR, ESSENTIAL: ICD-10-CM

## 2025-04-04 RX ORDER — PRIMIDONE 50 MG/1
TABLET ORAL
Qty: 180 TABLET | Refills: 5 | Status: SHIPPED | OUTPATIENT
Start: 2025-04-04

## 2025-04-04 NOTE — TELEPHONE ENCOUNTER
[facility] (supporting Brian Bear PA-C)         4/4/25  9:52 AM  Per your instructions I increased my Primidone 50 mg to 3 tablets each daily in the morning and evening. that seems to be helpful. Would you please send a refill RX to [facility] on [address] in [place] .  Thanks,  Carlos    ____________________________________________    Pended med and routed to the provider to review.

## 2025-05-02 ENCOUNTER — OFFICE VISIT (OUTPATIENT)
Dept: GERIATRICS | Facility: OTHER | Age: 75
End: 2025-05-02
Payer: COMMERCIAL

## 2025-05-02 VITALS
BODY MASS INDEX: 33.6 KG/M2 | OXYGEN SATURATION: 97 % | WEIGHT: 196.8 LBS | TEMPERATURE: 98.2 F | DIASTOLIC BLOOD PRESSURE: 76 MMHG | SYSTOLIC BLOOD PRESSURE: 110 MMHG | HEART RATE: 54 BPM | HEIGHT: 64 IN

## 2025-05-02 DIAGNOSIS — R97.20 BPH WITH ELEVATED PSA: Primary | ICD-10-CM

## 2025-05-02 DIAGNOSIS — D69.6 THROMBOCYTOPENIA (HCC): ICD-10-CM

## 2025-05-02 DIAGNOSIS — N40.0 BPH WITH ELEVATED PSA: Primary | ICD-10-CM

## 2025-05-02 DIAGNOSIS — E66.811 CLASS 1 OBESITY DUE TO EXCESS CALORIES WITH SERIOUS COMORBIDITY AND BODY MASS INDEX (BMI) OF 34.0 TO 34.9 IN ADULT: ICD-10-CM

## 2025-05-02 DIAGNOSIS — E53.8 VITAMIN B12 DEFICIENCY: ICD-10-CM

## 2025-05-02 DIAGNOSIS — F43.21 ADJUSTMENT DISORDER WITH DEPRESSED MOOD: ICD-10-CM

## 2025-05-02 DIAGNOSIS — E78.2 MIXED HYPERLIPIDEMIA: ICD-10-CM

## 2025-05-02 DIAGNOSIS — E66.09 CLASS 1 OBESITY DUE TO EXCESS CALORIES WITH SERIOUS COMORBIDITY AND BODY MASS INDEX (BMI) OF 34.0 TO 34.9 IN ADULT: ICD-10-CM

## 2025-05-02 PROCEDURE — 99214 OFFICE O/P EST MOD 30 MIN: CPT | Performed by: FAMILY MEDICINE

## 2025-05-02 NOTE — PATIENT INSTRUCTIONS
-Follow up in 2 weeks  -Blood work one week prior to appointment  -Referral to Dr. Graff- psychotherapy (through Wellness office)

## 2025-05-02 NOTE — PROGRESS NOTES
[facility]  [facility]    NAME: Louis Aguilar  AGE: 74 y.o. SEX: male    DATE OF ENCOUNTER: 5/2/25    Assessment and Plan     Problem List Items Addressed This Visit          Genitourinary    BPH with elevated PSA - Primary    Symptoms controlled with tamsulosin  PSA improving per most recent labs- repeat PSA ordered         Relevant Orders    PSA Total (Reflex To Free)       Hematopoietic and Hemostatic    Thrombocytopenia (HCC)    Chronic, idiopathic per history with baseline platelets reported to be 90s-100s  B12 deficiency may be contributing         Relevant Orders    CBC and differential       Behavioral Health    Adjustment disorder with depressed mood    With associated prolonged grief reaction  Psychosocial support provided, referral to psychotherapist- Dr. Graff- can be scheduled through the [facility]. Psychotherapy as first line treatment  Consider SSRI or SNRI- weight neutral or weight negative- however concern for possible worsening of tremors and would need close monitoring of intraocular pressure if initiated  Labs ordered, will plan for follow up in 2-3 weeks with plan to administer depression and anxiety scales at that time with consideration for initiation of medication (SSRI/SNRI) at that time            Other    Hyperlipidemia    Continue rosuvastatin  Dietary/lifestyle modification reviewed  Lipid panel ordered  TSH recently checked and WNL  Lab Results   Component Value Date    AEH7CGFPJUSL 2.841 10/14/2024             Relevant Orders    Lipid panel    Comprehensive metabolic panel    Class 1 obesity due to excess calories with body mass index (BMI) of 34.0 to 34.9 in adult    Encouraged patient to keep food diary to identify areas to start making small changes such as decreased portion or one less snack, etc  Gradual increase in exercise encouraged- attends group exercise twice weekly, consider adding in biking in the gym 1-2 times per  "week, walking outside, etc  Dietary and lifestyle modification with gradual and sustainable changes         Vitamin B12 deficiency    Continue oral B12 supplementation         Relevant Orders    Vitamin B12    CBC and differential       Orders Placed This Encounter   Procedures    Vitamin B12    CBC and differential    PSA Total (Reflex To Free)    Lipid panel    Comprehensive metabolic panel       Chief Complaint     Chief Complaint   Patient presents with    Follow-up       History of Present Illness     74 year old male evaluated for routine follow up. Primidone recently increased, has been following with neurology for essential tremor. Some improvement noted with this medication without adverse effects reported. Using weighted utensils which has been helpful for eating. Gradual weight gain of approximately 45lb in 2 years; overall decrease in physical activity. Is interested in increasing activity and weight loss.       The following portions of the patient's history were reviewed and updated as appropriate: allergies, current medications, past family history, past medical history, past social history, past surgical history and problem list.    Review of Systems     Review of Systems   Constitutional:  Positive for activity change. Negative for appetite change.       Active Problem List     Patient Active Problem List   Diagnosis    Tremor, essential    Hyperlipidemia    Ocular hypertension    Class 1 obesity due to excess calories with body mass index (BMI) of 34.0 to 34.9 in adult    BPH with elevated PSA    Thrombocytopenia (HCC)    Need for Tdap vaccination    Colon polyps    MRI of brain abnormal    Vitamin B12 deficiency    Adjustment disorder with depressed mood       Objective     /76 (BP Location: Left arm, Patient Position: Sitting, Cuff Size: Standard)   Pulse (!) 54   Temp 98.2 °F (36.8 °C) (Temporal)   Ht 5' 4\" (1.626 m)   Wt 89.3 kg (196 lb 12.8 oz)   SpO2 97%   BMI 33.78 kg/m² "     Physical Exam  Vitals reviewed.   Constitutional:       General: He is awake. He is not in acute distress.     Appearance: He is well-groomed. He is not toxic-appearing or diaphoretic.   HENT:      Head: Normocephalic and atraumatic.      Mouth/Throat:      Mouth: Mucous membranes are moist.   Eyes:      General: No scleral icterus.        Right eye: No discharge.         Left eye: No discharge.   Cardiovascular:      Rate and Rhythm: Normal rate.   Pulmonary:      Effort: Pulmonary effort is normal. No respiratory distress.   Skin:     General: Skin is warm and dry.      Coloration: Skin is not jaundiced or pale.   Neurological:      Mental Status: He is alert. Mental status is at baseline.      Cranial Nerves: No dysarthria or facial asymmetry.   Psychiatric:         Attention and Perception: Attention and perception normal.         Mood and Affect: Affect is tearful.         Speech: Speech normal.         Behavior: Behavior is cooperative.         Thought Content: Thought content normal.       Pertinent Laboratory/Diagnostic Studies:  Lab Results   Component Value Date    CGSCPIOA95 187 10/14/2024     Lab Results   Component Value Date    FOLATE 10.2 10/14/2024     Lab Results   Component Value Date    SODIUM 137 10/14/2024    K 4.3 10/14/2024     10/14/2024    CO2 26 10/14/2024    AGAP 8 10/14/2024    BUN 18 10/14/2024    CREATININE 1.08 10/14/2024    GLUF 91 10/14/2024    CALCIUM 9.4 10/14/2024    AST 15 10/14/2024    ALT 20 10/14/2024    ALKPHOS 109 (H) 10/14/2024    TP 6.6 10/14/2024    TBILI 0.77 10/14/2024    EGFR 67 10/14/2024     Lab Results   Component Value Date    WBC 5.10 10/14/2024    HGB 16.7 10/14/2024    HCT 49.1 10/14/2024    MCV 98 10/14/2024    PLT 90 (L) 10/14/2024     Lab Results   Component Value Date    CHOLESTEROL 205 (H) 10/14/2024     Lab Results   Component Value Date    HDL 56 10/14/2024     Lab Results   Component Value Date    TRIG 103 10/14/2024     Lab Results   Component  Value Date    [facility] 10/14/2024     Lab Results   Component Value Date    [id number] 2.841 10/14/2024             Current Medications     Current Outpatient Medications:     latanoprost (XALATAN) 0.005 % ophthalmic solution, Administer 1 drop to both eyes daily at bedtime, Disp: , Rfl:     primidone (MYSOLINE) 50 mg tablet, Take 3 tabs by mouth twice daily, Disp: 180 tablet, Rfl: 5    rosuvastatin (CRESTOR) 20 MG tablet, Take 1 tablet (20 mg total) by mouth daily, Disp: 90 tablet, Rfl: 2    tamsulosin (FLOMAX) 0.4 mg, Take 1 capsule (0.4 mg total) by mouth daily, Disp: 90 capsule, Rfl: 2    timolol (TIMOPTIC) 0.5 % ophthalmic solution, Administer 1 drop to the right eye 2 (two) times a day, Disp: , Rfl:     vitamin B-12 (VITAMIN B-12) 1,000 mcg tablet, Take 1 tablet (1,000 mcg total) by mouth daily, Disp: , Rfl:     Mela Patel, DO  5/2/25

## 2025-05-03 PROBLEM — F43.21 ADJUSTMENT DISORDER WITH DEPRESSED MOOD: Status: ACTIVE | Noted: 2025-05-03

## 2025-05-03 NOTE — ASSESSMENT & PLAN NOTE
With associated prolonged grief reaction  Psychosocial support provided, referral to psychotherapist- Dr. Graff- can be scheduled through the [facility]. Psychotherapy as first line treatment  Consider SSRI or SNRI- weight neutral or weight negative- however concern for possible worsening of tremors and would need close monitoring of intraocular pressure if initiated  Labs ordered, will plan for follow up in 2-3 weeks with plan to administer depression and anxiety scales at that time with consideration for initiation of medication (SSRI/SNRI) at that time

## 2025-05-03 NOTE — ASSESSMENT & PLAN NOTE
Chronic, idiopathic per history with baseline platelets reported to be 90s-100s  B12 deficiency may be contributing

## 2025-05-03 NOTE — ASSESSMENT & PLAN NOTE
Continue rosuvastatin  Dietary/lifestyle modification reviewed  Lipid panel ordered  TSH recently checked and WNL  Lab Results   Component Value Date    [id number] 2.841 10/14/2024

## 2025-05-03 NOTE — ASSESSMENT & PLAN NOTE
Encouraged patient to keep food diary to identify areas to start making small changes such as decreased portion or one less snack, etc  Gradual increase in exercise encouraged- attends group exercise twice weekly, consider adding in biking in the gym 1-2 times per week, walking outside, etc  Dietary and lifestyle modification with gradual and sustainable changes

## 2025-05-06 ENCOUNTER — APPOINTMENT (OUTPATIENT)
Dept: LAB | Facility: CLINIC | Age: 75
End: 2025-05-06
Payer: COMMERCIAL

## 2025-05-06 ENCOUNTER — OFFICE VISIT (OUTPATIENT)
Dept: GERIATRICS | Facility: OTHER | Age: 75
End: 2025-05-06
Payer: COMMERCIAL

## 2025-05-06 DIAGNOSIS — E78.2 MIXED HYPERLIPIDEMIA: ICD-10-CM

## 2025-05-06 DIAGNOSIS — D69.6 THROMBOCYTOPENIA (HCC): ICD-10-CM

## 2025-05-06 DIAGNOSIS — N40.0 BPH WITH ELEVATED PSA: ICD-10-CM

## 2025-05-06 DIAGNOSIS — E53.8 VITAMIN B12 DEFICIENCY: ICD-10-CM

## 2025-05-06 DIAGNOSIS — R97.20 BPH WITH ELEVATED PSA: ICD-10-CM

## 2025-05-06 DIAGNOSIS — H61.23 EXCESSIVE CERUMEN IN BOTH EAR CANALS: Primary | ICD-10-CM

## 2025-05-06 LAB
ALBUMIN SERPL BCG-MCNC: 4.4 G/DL (ref 3.5–5)
ALP SERPL-CCNC: 112 U/L (ref 34–104)
ALT SERPL W P-5'-P-CCNC: 22 U/L (ref 7–52)
ANION GAP SERPL CALCULATED.3IONS-SCNC: 8 MMOL/L (ref 4–13)
AST SERPL W P-5'-P-CCNC: 17 U/L (ref 13–39)
BASOPHILS # BLD AUTO: 0.05 THOUSANDS/ÂΜL (ref 0–0.1)
BASOPHILS NFR BLD AUTO: 1 % (ref 0–1)
BILIRUB SERPL-MCNC: 0.64 MG/DL (ref 0.2–1)
BUN SERPL-MCNC: 18 MG/DL (ref 5–25)
CALCIUM SERPL-MCNC: 9.7 MG/DL (ref 8.4–10.2)
CHLORIDE SERPL-SCNC: 104 MMOL/L (ref 96–108)
CHOLEST SERPL-MCNC: 228 MG/DL (ref ?–200)
CO2 SERPL-SCNC: 27 MMOL/L (ref 21–32)
CREAT SERPL-MCNC: 1.05 MG/DL (ref 0.6–1.3)
EOSINOPHIL # BLD AUTO: 0.13 THOUSAND/ÂΜL (ref 0–0.61)
EOSINOPHIL NFR BLD AUTO: 3 % (ref 0–6)
ERYTHROCYTE [DISTWIDTH] IN BLOOD BY AUTOMATED COUNT: 13 % (ref 11.6–15.1)
GFR SERPL CREATININE-BSD FRML MDRD: 69 ML/MIN/1.73SQ M
GLUCOSE P FAST SERPL-MCNC: 92 MG/DL (ref 65–99)
HCT VFR BLD AUTO: 49.4 % (ref 36.5–49.3)
HDLC SERPL-MCNC: 57 MG/DL
HGB BLD-MCNC: 17 G/DL (ref 12–17)
IMM GRANULOCYTES # BLD AUTO: 0.01 THOUSAND/UL (ref 0–0.2)
IMM GRANULOCYTES NFR BLD AUTO: 0 % (ref 0–2)
LDLC SERPL CALC-MCNC: 145 MG/DL (ref 0–100)
LYMPHOCYTES # BLD AUTO: 1.18 THOUSANDS/ÂΜL (ref 0.6–4.47)
LYMPHOCYTES NFR BLD AUTO: 25 % (ref 14–44)
MCH RBC QN AUTO: 33.7 PG (ref 26.8–34.3)
MCHC RBC AUTO-ENTMCNC: 34.4 G/DL (ref 31.4–37.4)
MCV RBC AUTO: 98 FL (ref 82–98)
MONOCYTES # BLD AUTO: 0.38 THOUSAND/ÂΜL (ref 0.17–1.22)
MONOCYTES NFR BLD AUTO: 8 % (ref 4–12)
NEUTROPHILS # BLD AUTO: 2.99 THOUSANDS/ÂΜL (ref 1.85–7.62)
NEUTS SEG NFR BLD AUTO: 63 % (ref 43–75)
NONHDLC SERPL-MCNC: 171 MG/DL
NRBC BLD AUTO-RTO: 0 /100 WBCS
PLATELET # BLD AUTO: 85 THOUSANDS/UL (ref 149–390)
PMV BLD AUTO: 13.9 FL (ref 8.9–12.7)
POTASSIUM SERPL-SCNC: 4.6 MMOL/L (ref 3.5–5.3)
PROT SERPL-MCNC: 6.7 G/DL (ref 6.4–8.4)
PSA SERPL-MCNC: 2.93 NG/ML (ref 0–4)
RBC # BLD AUTO: 5.05 MILLION/UL (ref 3.88–5.62)
SODIUM SERPL-SCNC: 139 MMOL/L (ref 135–147)
TRIGL SERPL-MCNC: 129 MG/DL (ref ?–150)
VIT B12 SERPL-MCNC: 772 PG/ML (ref 180–914)
WBC # BLD AUTO: 4.74 THOUSAND/UL (ref 4.31–10.16)

## 2025-05-06 PROCEDURE — 36415 COLL VENOUS BLD VENIPUNCTURE: CPT

## 2025-05-06 PROCEDURE — 84153 ASSAY OF PSA TOTAL: CPT

## 2025-05-06 PROCEDURE — 82607 VITAMIN B-12: CPT

## 2025-05-06 PROCEDURE — 85025 COMPLETE CBC W/AUTO DIFF WBC: CPT

## 2025-05-06 PROCEDURE — 69210 REMOVE IMPACTED EAR WAX UNI: CPT | Performed by: NURSE PRACTITIONER

## 2025-05-06 PROCEDURE — 80061 LIPID PANEL: CPT

## 2025-05-06 PROCEDURE — 80053 COMPREHEN METABOLIC PANEL: CPT

## 2025-05-07 NOTE — PROGRESS NOTES
Ear cerumen removal    Date/Time: 5/6/2025 1:00 PM    Performed by: CARLOS ENRIQUE Ge  Authorized by: CARLOS ENRIQUE Ge    Universal Protocol:  procedure performed by consultantConsent: Verbal consent obtained.  Consent given by: patient  Patient understanding: patient states understanding of the procedure being performed  Patient identity confirmed: verbally with patient    Patient location:  Clinic  Indications / Diagnosis:  Excessive cerumen of bilateral ear canals  Procedure details:     Local anesthetic:  None    Location:  Both ears    Procedure type: irrigation with instrumentation      Instrumentation: curette      Approach:  Internal and natural orifice  Post-procedure details:     Complication:  None    Hearing quality:  Normal    Patient tolerance of procedure:  Tolerated well, no immediate complications    Primary Care at [facility]

## 2025-05-16 ENCOUNTER — OFFICE VISIT (OUTPATIENT)
Dept: GERIATRICS | Facility: OTHER | Age: 75
End: 2025-05-16
Payer: COMMERCIAL

## 2025-05-16 VITALS
OXYGEN SATURATION: 97 % | DIASTOLIC BLOOD PRESSURE: 76 MMHG | BODY MASS INDEX: 33.36 KG/M2 | WEIGHT: 195.4 LBS | SYSTOLIC BLOOD PRESSURE: 124 MMHG | HEIGHT: 64 IN | TEMPERATURE: 98.1 F | HEART RATE: 62 BPM

## 2025-05-16 DIAGNOSIS — E78.2 MIXED HYPERLIPIDEMIA: ICD-10-CM

## 2025-05-16 DIAGNOSIS — E66.09 CLASS 1 OBESITY DUE TO EXCESS CALORIES WITH SERIOUS COMORBIDITY AND BODY MASS INDEX (BMI) OF 34.0 TO 34.9 IN ADULT: ICD-10-CM

## 2025-05-16 DIAGNOSIS — E66.811 CLASS 1 OBESITY DUE TO EXCESS CALORIES WITH SERIOUS COMORBIDITY AND BODY MASS INDEX (BMI) OF 34.0 TO 34.9 IN ADULT: ICD-10-CM

## 2025-05-16 DIAGNOSIS — D69.6 THROMBOCYTOPENIA (HCC): ICD-10-CM

## 2025-05-16 DIAGNOSIS — F43.21 ADJUSTMENT DISORDER WITH DEPRESSED MOOD: Primary | ICD-10-CM

## 2025-05-16 PROCEDURE — 99214 OFFICE O/P EST MOD 30 MIN: CPT | Performed by: FAMILY MEDICINE

## 2025-05-16 NOTE — PROGRESS NOTES
[facility]  [facility]    NAME: Louis Aguilar  AGE: 74 y.o. SEX: male    DATE OF ENCOUNTER: 5/16/25    Assessment and Plan     Problem List Items Addressed This Visit          Hematopoietic and Hemostatic    Thrombocytopenia (HCC)    Chronic, idiopathic per history with baseline platelets 90s-100s  Most recent CBC with platelets of 85- plan to recheck CMP and CBC in 2-3 months for reassessment, will order at next visit            Behavioral Health    Adjustment disorder with depressed mood    With associated prolonged grief reaction  GDS 2/15  Psychosocial support provided, has seen psychotherapist- Dr. Graff- at [facility] x1 visit so far. Psychotherapy is first line treatment and will have more sustained effects than medication alone  Consider SSRI or SNRI- weight neutral or weight negative- however concern for possible worsening of tremors and would need close monitoring of intraocular pressure if initiated- no history of glaucoma but does have elevated eye pressures for which he follows with eye doctor regularly, +family history of glaucoma  Would consider initiation of low dose sertraline (25mg daily) at follow up visit in approximately 6 weeks pending progress with psychotherapy             Other    Hyperlipidemia - Primary    Continue rosuvastatin at current dose  Dietary/lifestyle modification reviewed         Class 1 obesity due to excess calories with body mass index (BMI) of 34.0 to 34.9 in adult    Dietary and lifestyle modification reviewed with goal for gradual and sustainable changes  Patient with weight loss of 1.5lb since last visit with making small changes!            Chief Complaint     Chief Complaint   Patient presents with    Follow-up       History of Present Illness     74 year old male evaluated for close follow up. Has met with psychotherapy for one session; follow up pending scheduling. GDS 2/15. Weight loss of 1.5lb since last visit!      The  "following portions of the patient's history were reviewed and updated as appropriate: allergies, current medications, past family history, past medical history, past social history, past surgical history and problem list.    Review of Systems     Review of Systems   Constitutional:  Negative for activity change, appetite change and unexpected weight change.       Active Problem List   Problem List[1]    Objective     /76 (BP Location: Left arm, Patient Position: Sitting, Cuff Size: Standard)   Pulse 62   Temp 98.1 °F (36.7 °C) (Temporal)   Ht 5' 4\" (1.626 m)   Wt 88.6 kg (195 lb 6.4 oz)   SpO2 97%   BMI 33.54 kg/m²     Physical Exam  Vitals reviewed.   Constitutional:       General: He is awake.      Appearance: He is well-groomed. He is not toxic-appearing or diaphoretic.   HENT:      Head: Normocephalic and atraumatic.      Mouth/Throat:      Mouth: Mucous membranes are moist.   Pulmonary:      Effort: Pulmonary effort is normal. No respiratory distress.     Skin:     Coloration: Skin is not jaundiced or pale.     Neurological:      Mental Status: He is alert. Mental status is at baseline.     Psychiatric:         Attention and Perception: Attention and perception normal.         Speech: Speech normal.         Behavior: Behavior is cooperative.         Thought Content: Thought content normal.       Pertinent Laboratory/Diagnostic Studies:  Lab Results   Component Value Date    WBC 4.74 05/06/2025    HGB 17.0 05/06/2025    HCT 49.4 (H) 05/06/2025    MCV 98 05/06/2025    PLT 85 (L) 05/06/2025     Lab Results   Component Value Date    SODIUM 139 05/06/2025    K 4.6 05/06/2025     05/06/2025    CO2 27 05/06/2025    AGAP 8 05/06/2025    BUN 18 05/06/2025    CREATININE 1.05 05/06/2025    GLUF 92 05/06/2025    CALCIUM 9.7 05/06/2025    AST 17 05/06/2025    ALT 22 05/06/2025    ALKPHOS 112 (H) 05/06/2025    TP 6.7 05/06/2025    TBILI 0.64 05/06/2025    EGFR 69 05/06/2025     Lab Results   Component Value " Date    YIXOSPKX32 772 05/06/2025     Lab Results   Component Value Date    PSA 2.934 05/06/2025     Lab Results   Component Value Date    CHOLESTEROL 228 (H) 05/06/2025    CHOLESTEROL 205 (H) 10/14/2024     Lab Results   Component Value Date    HDL 57 05/06/2025    HDL 56 10/14/2024     Lab Results   Component Value Date    TRIG 129 05/06/2025    TRIG 103 10/14/2024     Lab Results   Component Value Date    NONHDLC 171 05/06/2025    NONHDLC 149 10/14/2024            Current Medications   Current Medications[2]    Mela Patel DO  5/16/25                [1]   Patient Active Problem List  Diagnosis    Tremor, essential    Hyperlipidemia    Ocular hypertension    Class 1 obesity due to excess calories with body mass index (BMI) of 34.0 to 34.9 in adult    BPH with elevated PSA    Thrombocytopenia (HCC)    Colon polyps    MRI of brain abnormal    Vitamin B12 deficiency    Adjustment disorder with depressed mood   [2]   Current Outpatient Medications:     latanoprost (XALATAN) 0.005 % ophthalmic solution, Administer 1 drop to both eyes daily at bedtime, Disp: , Rfl:     primidone (MYSOLINE) 50 mg tablet, Take 3 tabs by mouth twice daily, Disp: 180 tablet, Rfl: 5    rosuvastatin (CRESTOR) 20 MG tablet, Take 1 tablet (20 mg total) by mouth daily, Disp: 90 tablet, Rfl: 2    tamsulosin (FLOMAX) 0.4 mg, Take 1 capsule (0.4 mg total) by mouth daily, Disp: 90 capsule, Rfl: 2    timolol (TIMOPTIC) 0.5 % ophthalmic solution, Administer 1 drop to the right eye in the morning and 1 drop in the evening., Disp: , Rfl:     vitamin B-12 (VITAMIN B-12) 1,000 mcg tablet, Take 1 tablet (1,000 mcg total) by mouth daily, Disp: , Rfl:

## 2025-05-18 PROBLEM — Z23 NEED FOR TDAP VACCINATION: Status: RESOLVED | Noted: 2024-05-24 | Resolved: 2025-05-18

## 2025-05-18 NOTE — ASSESSMENT & PLAN NOTE
With associated prolonged grief reaction  GDS 2/15  Psychosocial support provided, has seen psychotherapist- Dr. Graff- at [facility] x1 visit so far. Psychotherapy is first line treatment and will have more sustained effects than medication alone  Consider SSRI or SNRI- weight neutral or weight negative- however concern for possible worsening of tremors and would need close monitoring of intraocular pressure if initiated- no history of glaucoma but does have elevated eye pressures for which he follows with eye doctor regularly, +family history of glaucoma  Would consider initiation of low dose sertraline (25mg daily) at follow up visit in approximately 6 weeks pending progress with psychotherapy

## 2025-05-18 NOTE — ASSESSMENT & PLAN NOTE
Dietary and lifestyle modification reviewed with goal for gradual and sustainable changes  Patient with weight loss of 1.5lb since last visit with making small changes!

## 2025-05-18 NOTE — ASSESSMENT & PLAN NOTE
Chronic, idiopathic per history with baseline platelets 90s-100s  Most recent CBC with platelets of 85- plan to recheck CMP and CBC in 2-3 months for reassessment, will order at next visit

## 2025-06-20 ENCOUNTER — TELEPHONE (OUTPATIENT)
Dept: NEUROLOGY | Facility: CLINIC | Age: 75
End: 2025-06-20

## 2025-06-25 ENCOUNTER — TELEPHONE (OUTPATIENT)
Dept: NEUROLOGY | Facility: CLINIC | Age: 75
End: 2025-06-25

## 2025-06-25 ENCOUNTER — TELEMEDICINE (OUTPATIENT)
Dept: NEUROLOGY | Facility: CLINIC | Age: 75
End: 2025-06-25
Payer: COMMERCIAL

## 2025-06-25 DIAGNOSIS — G25.0 TREMOR, ESSENTIAL: Primary | ICD-10-CM

## 2025-06-25 DIAGNOSIS — R90.89 MRI OF BRAIN ABNORMAL: ICD-10-CM

## 2025-06-25 PROCEDURE — G2211 COMPLEX E/M VISIT ADD ON: HCPCS | Performed by: PHYSICIAN ASSISTANT

## 2025-06-25 PROCEDURE — 99212 OFFICE O/P EST SF 10 MIN: CPT | Performed by: PHYSICIAN ASSISTANT

## 2025-06-25 RX ORDER — PRIMIDONE 50 MG/1
TABLET ORAL
Start: 2025-06-25

## 2025-06-25 NOTE — PROGRESS NOTES
Virtual Regular Visit  Name: Louis Aguilar      : 1950      MRN: [id number]  Encounter Provider: Brian Bear PA-C  Encounter Date: 2025   Encounter department: [facility] [place]  :  Assessment & Plan        History of Present Illness {?Quick Links Encounters * My Last Note * Last Note in Specialty * Snapshot * Since Last Visit * History :57429}    HPI  Review of Systems   Constitutional:  Negative for appetite change, fatigue and fever.   HENT: Negative.  Negative for hearing loss, tinnitus, trouble swallowing and voice change.    Eyes: Negative.  Negative for photophobia, pain and visual disturbance.   Respiratory: Negative.  Negative for shortness of breath.    Cardiovascular: Negative.  Negative for palpitations.   Gastrointestinal: Negative.  Negative for nausea and vomiting.   Endocrine: Negative.  Negative for cold intolerance.   Genitourinary: Negative.  Negative for dysuria, frequency and urgency.   Musculoskeletal:  Negative for back pain, gait problem, myalgias, neck pain and neck stiffness.   Skin: Negative.  Negative for rash.   Allergic/Immunologic: Negative.    Neurological:  Negative for dizziness, tremors, seizures, syncope, facial asymmetry, speech difficulty, weakness, light-headedness, numbness and headaches.   Hematological: Negative.  Does not bruise/bleed easily.   Psychiatric/Behavioral: Negative.  Negative for confusion, hallucinations and sleep disturbance.    All other systems reviewed and are negative.      Objective {?Quick Links Trend Vitals * Enter New Vitals * Results Review * Timeline (Adult) * Labs * Imaging * Cardiology * Procedures * Lung Cancer Screening * Surgical eConsent :89063}  There were no vitals taken for this visit.    Physical Exam    Administrative Statements   Encounter provider Brian Bear PA-C    The Patient is located at {[facility] Virtual Patient Location:[address]} and in the following state in which I hold an active license {[place]  [facility] virtual patient location:[address]}.    The patient was identified by name and date of birth. Louis Aguilar was informed that this is a telemedicine visit and that the visit is being conducted through {AMB VIRTUAL VISIT MEDIUM:50668}.  {Telemedicine confidentiality :36939} {Telemedicine participants:26812}  He acknowledged consent and understanding of privacy and security of the video platform. The patient has agreed to participate and understands they can discontinue the visit at any time.    I have spent a total time of *** minutes in caring for this patient on the day of the visit/encounter including {[facility] Counseling Topics:[id number]}, not including the time spent for establishing the audio/video connection.

## 2025-06-25 NOTE — PROGRESS NOTES
Virtual Regular Visit  Name: Louis Aguilar      : 1950      MRN: [id number]  Encounter Provider: Brian Bear PA-C  Encounter Date: 2025   Encounter department: [facility] [place]  :  Assessment & Plan  Tremor, essential  Patient with slowly progressive left greater than right action and postural hand tremor.  He has not noticed any significant improvement with low-dose primidone.  No side effects.  Unable to use beta-blockers due to bradycardia as well as topiramate due to possible glaucoma.    He has not noticed any significant improvement of tremors with increasing his primidone dose.  No side effects.  At this time he does feel as though the medicine wears off in the middle of the day.  Because of this we discussed the option of adding a third dose in the middle of the day to see if this would hold him over.  We also discussed using his weighted utensils in the evening when he was down to the dining rouse as this is a time that the tremors can be bothersome for him.    At this time he will be taking primidone 50 mg 3 tabs in the morning, 1 tab in the afternoon and 3 tabs in the evening for the next 2 to 3 weeks.  If tolerated he can further increase to 3 tabs in the morning, 2 tabs in the afternoon and 3 tabs in the evening.  He watch for any side effects with the addition of a daytime dose including fatigue or dizziness.    If there is no improvement with higher doses of primidone or the side effects, we may need to consider alternative options such as gabapentin or Remeron.  Unfortunately unable to use propranolol due to bradycardia and topiramate due to glaucoma.           Orders:    primidone (MYSOLINE) 50 mg tablet; Take 3 tabs qam, 1tab qafternoon, 3tabs qpm    MRI of brain abnormal  Prior imaging with question of NPH.  He has not had any clear signs of NPH such as urinary incontinence or significant cognitive decline.  He does describe some imbalance and shuffling  gait at times however this is not consistent.  We will continue to monitor.  May consider repeat imaging and/or referral to NPH clinic if needed in the future.               History of Present Illness     Louis Aguilar is a 74 year old with essential tremor who presents for movement follow up.      Bilateral action hand tremor present since 2016. Denies ever having any rest tremor, loss of dexterity or changes in gait. First seen May 2024 on ropinirole with subtle left postural tremor with hand outstretched, bilateral intentional tremor, perhaps mild decrement on finger taps. More suggestive of ET than PD.      At his last visit his Primidone dose was increased further.     INTERVAL HISTORY:  No significant benefit with the increased dose   He has good and bad days   Feels that he AM dose wears off in the afternoon   Tremors are worse on the left, present in both   He struggles with eating and drinking due to the tremors   No tremors when sleeping   No real resting tremor, more when doing things with the hands     He does notice some changes in his gait at times, feels that he slows and shuffles at times   No changes with urinary control   No real changes in cognition, he does still play chess weekly      Current medications:  Primidone 50mg 3tabs (10am) 3tabs (6-7pm)     Prior medications:  Topiramate - 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    Physical Exam  Constitutional:       Appearance: Normal appearance.     Eyes:      Extraocular Movements: Extraocular movements intact.     Pulmonary:      Effort: Pulmonary effort is normal.     Neurological:      Mental Status: He is alert and oriented to person, place, and time.      Comments: Very slight left postural tremor with hand outstretched. 1  Bilateral intentional tremor on FTN L>R 2,1   No head,  leg or vocal tremor.   No rest tremor.  mild generalized decrement on finger taps   Normal HG, KIKI HT     Psychiatric:         Attention and Perception: Attention normal.         Behavior: Behavior is cooperative.         Administrative Statements   Encounter provider Brian Bear PA-C    The Patient is located at Home and in the following state in which I hold an active license [place].    The patient was identified by name and date of birth. Louis Aguilar was informed that this is a telemedicine visit and that the visit is being conducted through the Epic Embedded platform. He agrees to proceed..  My office door was closed. No one else was in the room.  He acknowledged consent and understanding of privacy and security of the video platform. The patient has agreed to participate and understands they can discontinue the visit at any time.    I have spent a total time of 16 minutes in caring for this patient on the day of the visit/encounter including Risks and benefits of tx options, Instructions for management, Patient and family education, Impressions, Counseling / Coordination of care, Documenting in the medical record, and Obtaining or reviewing history  , not including the time spent for establishing the audio/video connection.

## 2025-06-25 NOTE — TELEPHONE ENCOUNTER
Patient called in to schedule a 3 month F/U with Brian Bear. LOV was today 6/25/25 telemed.     F/U appt needed in 3 months.     I was unable to schedule pt with in the 3 months.  The 1st available that I can find was  Friday Jan 23 rd at 10:00 am.  Explained that we do have staff that work the wait list. Pt declined and did not want to wait that long to be scheduled.     Can you please assist with sooner appt for pt? I have placed on wait list.

## 2025-06-25 NOTE — ASSESSMENT & PLAN NOTE
Prior imaging with question of NPH.  He has not had any clear signs of NPH such as urinary incontinence or significant cognitive decline.  He does describe some imbalance and shuffling gait at times however this is not consistent.  We will continue to monitor.  May consider repeat imaging and/or referral to NPH clinic if needed in the future.

## 2025-06-25 NOTE — ASSESSMENT & PLAN NOTE
Patient with slowly progressive left greater than right action and postural hand tremor.  He has not noticed any significant improvement with low-dose primidone.  No side effects.  Unable to use beta-blockers due to bradycardia as well as topiramate due to possible glaucoma.    He has not noticed any significant improvement of tremors with increasing his primidone dose.  No side effects.  At this time he does feel as though the medicine wears off in the middle of the day.  Because of this we discussed the option of adding a third dose in the middle of the day to see if this would hold him over.  We also discussed using his weighted utensils in the evening when he was down to the dining rouse as this is a time that the tremors can be bothersome for him.    At this time he will be taking primidone 50 mg 3 tabs in the morning, 1 tab in the afternoon and 3 tabs in the evening for the next 2 to 3 weeks.  If tolerated he can further increase to 3 tabs in the morning, 2 tabs in the afternoon and 3 tabs in the evening.  He watch for any side effects with the addition of a daytime dose including fatigue or dizziness.    If there is no improvement with higher doses of primidone or the side effects, we may need to consider alternative options such as gabapentin or Remeron.  Unfortunately unable to use propranolol due to bradycardia and topiramate due to glaucoma.           Orders:    primidone (MYSOLINE) 50 mg tablet; Take 3 tabs qam, 1tab qafternoon, 3tabs qpm

## 2025-07-07 ENCOUNTER — NURSE TRIAGE (OUTPATIENT)
Age: 75
End: 2025-07-07

## 2025-07-07 ENCOUNTER — PATIENT MESSAGE (OUTPATIENT)
Dept: NEUROLOGY | Facility: CLINIC | Age: 75
End: 2025-07-07

## 2025-07-07 NOTE — TELEPHONE ENCOUNTER
"REASON FOR CONVERSATION: Dizziness    SYMPTOMS: dizziness since Saturday. States it comes and goes, no aggravating factors. BP at health center today was 160/80 and his heart rate was 80. He is able to do everyday actives but does feel like his left inner ear is \"clogged\"    OTHER HEALTH INFORMATION: Has had vertigo in past, not currently prescribed anything for it. Also states he has chronic issues with wax buildup which does cause him to have similar symptoms.     PROTOCOL DISPOSITION: See Within 3 Days in Office    CARE ADVICE PROVIDED: Scheduled to be seen tomorrow at 1130 at [facility]. Did encourage patient to stay hydrated and to make sure he is eating a balanced diet. Advised to call back with worsening dizziness or new symptoms. He verbalized understanding.     PRACTICE FOLLOW-UP: none          Reason for Disposition   MILD dizziness (e.g., walking normally) and has NOT been evaluated by physician for this (Exception: Dizziness caused by heat exposure, sudden standing, or poor fluid intake.)    Answer Assessment - Initial Assessment Questions  1. DESCRIPTION: \"Describe your dizziness.\"      Lightheadhed ness   2. LIGHTHEADED: \"Do you feel lightheaded?\" (e.g., somewhat faint, woozy, weak upon standing)     Imbalance no falling   3. VERTIGO: \"Do you feel like either you or the room is spinning or tilting?\" (i.e., vertigo)        4. SEVERITY: \"How bad is it?\"  \"Do you feel like you are going to faint?\" \"Can you stand and walk?\"      Yes   5. ONSET:  \"When did the dizziness begin?\"      Saturday   6. AGGRAVATING FACTORS: \"Does anything make it worse?\" (e.g., standing, change in head position)      No   7. HEART RATE: \"Can you tell me your heart rate?\" \"How many beats in 15 seconds?\"  (Note: Not all patients can do this.)        No   8. CAUSE: \"What do you think is causing the dizziness?\" (e.g., decreased fluids or food, diarrhea, emotional distress, heat exposure, new medicine, sudden standing, vomiting; " "unknown)      Unsure   9. RECURRENT SYMPTOM: \"Have you had dizziness before?\" If Yes, ask: \"When was the last time?\" \"What happened that time?\"      Yes has had this before   10. OTHER SYMPTOMS: \"Do you have any other symptoms?\" (e.g., fever, chest pain, vomiting, diarrhea, bleeding)        No    Protocols used: Dizziness-Adult-[place]    "

## 2025-07-07 NOTE — TELEPHONE ENCOUNTER
Patient stated that he has been having dizzy spell since Saturday he has a history of vertigo. Patient was transferred to the nurse line to further assist.

## 2025-07-08 ENCOUNTER — OFFICE VISIT (OUTPATIENT)
Dept: GERIATRICS | Facility: OTHER | Age: 75
End: 2025-07-08
Payer: COMMERCIAL

## 2025-07-08 VITALS
BODY MASS INDEX: 32.95 KG/M2 | HEIGHT: 64 IN | DIASTOLIC BLOOD PRESSURE: 80 MMHG | WEIGHT: 193 LBS | OXYGEN SATURATION: 97 % | TEMPERATURE: 97.6 F | SYSTOLIC BLOOD PRESSURE: 130 MMHG | HEART RATE: 63 BPM

## 2025-07-08 DIAGNOSIS — G25.0 TREMOR, ESSENTIAL: ICD-10-CM

## 2025-07-08 DIAGNOSIS — E53.8 VITAMIN B12 DEFICIENCY: ICD-10-CM

## 2025-07-08 DIAGNOSIS — R42 VERTIGO: Primary | ICD-10-CM

## 2025-07-08 PROCEDURE — 99214 OFFICE O/P EST MOD 30 MIN: CPT | Performed by: NURSE PRACTITIONER

## 2025-07-08 NOTE — PROGRESS NOTES
"Name: Louis Aguilar      : 1950      MRN: [id number]  Encounter Provider: CARLOS ENRIQUE Ge  Encounter Date: 2025   [facility]  :  Assessment & Plan  Vertigo  Vertigo suspected to be side effect to recent increase in Primidone.  Recommend follow-up with Neurology to discuss decreasing dose.  Adequate fluid intake encouraged.        Tremor, essential  History of left greater than right hand tremors  Patient's primidone was recently increased and most likely causing side effect of vertigo  Patient to follow-up with Neurology.        Vitamin B12 deficiency  Vitamin B-12 level normal 2025  Continue vitamin B-12 supplementation.            History of Present Illness   HPI  Louis Aguilar is a 74 y.o. male who presents today for complaint of vertigo. He states that he woke up Saturday am, sat up in bed and felt vertigo coming on. It subsided a little bit during the day.  he states that he felt better. Yesterday still has lightheadedness and a headache. Room not spinning but close to it. [place] something in left ear on Saturday, a crackling sound.  History of BPPV and essential tremor. He has been taking primidone as prescribed by Neurology, which was recently increased.       Review of Systems   Constitutional:  Positive for fatigue. Negative for fever.   HENT:  Positive for hearing loss.    Respiratory:  Negative for chest tightness and shortness of breath.    Cardiovascular:  Negative for chest pain, palpitations and leg swelling.   Gastrointestinal:  Positive for nausea.        Saturday had dry heaves   Musculoskeletal:         Achy joints   Neurological:  Negative for dizziness and headaches.          Objective   /80 (BP Location: Right arm, Patient Position: Sitting, Cuff Size: Standard)   Pulse 63   Temp 97.6 °F (36.4 °C) (Temporal)   Ht 5' 4\" (1.626 m)   Wt 87.5 kg (193 lb)   SpO2 97%   BMI 33.13 kg/m²      Physical " Exam  Vitals reviewed.   Constitutional:       General: He is not in acute distress.     Appearance: Normal appearance. He is not ill-appearing, toxic-appearing or diaphoretic.   HENT:      Head: Normocephalic and atraumatic.      Right Ear: Tympanic membrane, ear canal and external ear normal. There is no impacted cerumen.      Left Ear: Tympanic membrane, ear canal and external ear normal. There is no impacted cerumen.      Nose: Nose normal. No congestion or rhinorrhea.      Mouth/Throat:      Mouth: Mucous membranes are moist.      Pharynx: Oropharynx is clear. No oropharyngeal exudate or posterior oropharyngeal erythema.     Neurological:      Mental Status: He is alert.

## 2025-07-08 NOTE — ASSESSMENT & PLAN NOTE
Vertigo suspected to be side effect to recent increase in Primidone.  Recommend follow-up with Neurology to discuss decreasing dose.  Adequate fluid intake encouraged.

## 2025-07-09 NOTE — PATIENT COMMUNICATION
"Spoke with pt to get more information. Pt reports that he increased his dose of primidone as advised during the video visit on 6/25. He started to experience lightheadedness. He had extreme vertigo on Saturday, so he decreased his dose of primidone to 3 tabs daily (late morning). Since decreasing the dose to 3 tabs, the vertigo is gone but he still feels lightheaded. Pt stated that the tremor is still there - he has good days and bad days. Reviewed the provider's last OV note. Pt stated that he has no preference when it comes to either trying gabapentin or Remeron. He is not familiar with either med. Pt has \"tons\" of primidone at home, so if it is discontinued the office will need to cancel the script at [facility], as he is on auto refill with the pharmacy. Pt also wanted to make the provider aware that he tried to schedule a 3 month follow up, but the soonest available appointment was not until January.  "

## 2025-07-10 NOTE — PATIENT COMMUNICATION
Patient aware of recommendation and verbalized understanding; said he will research medications recommended and cb with decision.    Visit moved up to 10/2/2025 and patient added to wait list.

## 2025-07-14 ENCOUNTER — PATIENT MESSAGE (OUTPATIENT)
Dept: NEUROLOGY | Facility: CLINIC | Age: 75
End: 2025-07-14

## 2025-07-14 NOTE — PATIENT COMMUNICATION
Carlos preciado P [facility] (supporting Brian Bear PA-C)        7/14/25  9:54 AM  I do not want to take either [place] or Remeron as suggested.  I am currently taking 3 tabs of Primidone per day with little side effects, while controlling the tremors to a tolerable level.  I'll see you on Oct 2 at 11:00 in [place] office.  Thanks,  Carlos   [phone]

## 2025-07-16 ENCOUNTER — TELEPHONE (OUTPATIENT)
Dept: NEUROLOGY | Facility: CLINIC | Age: 75
End: 2025-07-16

## 2025-07-17 ENCOUNTER — OFFICE VISIT (OUTPATIENT)
Dept: NEUROLOGY | Facility: CLINIC | Age: 75
End: 2025-07-17
Payer: COMMERCIAL

## 2025-07-17 VITALS
DIASTOLIC BLOOD PRESSURE: 60 MMHG | HEART RATE: 64 BPM | WEIGHT: 192 LBS | BODY MASS INDEX: 31.99 KG/M2 | SYSTOLIC BLOOD PRESSURE: 110 MMHG | OXYGEN SATURATION: 98 % | HEIGHT: 65 IN

## 2025-07-17 DIAGNOSIS — G25.0 TREMOR, ESSENTIAL: Primary | ICD-10-CM

## 2025-07-17 PROCEDURE — G2211 COMPLEX E/M VISIT ADD ON: HCPCS | Performed by: PHYSICIAN ASSISTANT

## 2025-07-17 PROCEDURE — 99213 OFFICE O/P EST LOW 20 MIN: CPT | Performed by: PHYSICIAN ASSISTANT

## 2025-07-17 RX ORDER — PRIMIDONE 50 MG/1
TABLET ORAL
Status: SHIPPED
Start: 2025-07-17

## 2025-07-17 NOTE — PROGRESS NOTES
Name: Louis Aguilar      : 1950      MRN: [id number]  Encounter Provider: Brian Bear PA-C  Encounter Date: 2025   Encounter department: [facility] [place]  :  Assessment & Plan  Tremor, essential  Patient with slowly progressive left greater than right action and postural hand tremors for a number of years.  Unfortunately he had side effects with higher dosing of primidone and has since reduced down to 3 tabs just in the morning.  Tremors remain however they are overall tolerable for him at this time.  On exam he was noted to have left greater than right action hand tremors along with very slight postural hand tremor.  No clear parkinsonian symptoms.  At this time his history and exam remain consistent with his diagnosis of essential tremor.    We once again had a discussion in regards to alternative medication options for his essential tremor.  We have avoided propranolol given he does have a lower pulse and blood pressure.  We have also avoided topiramate given possible glaucoma.  He did research side effects for gabapentin and Remeron and at this time he is not interested in either of those.  We also did briefly discuss surgical options and he is not interested in these either.    At this time we will have him remain on primidone 50 mg 3 tabs in the morning.    Orders:    primidone (MYSOLINE) 50 mg tablet; Take 3 tabs qam          History of Present Illness   Louis Aguilar is a 74 year old with essential tremor who presents for movement follow up.      Bilateral action hand tremor present since . Denies ever having any rest tremor, loss of dexterity or changes in gait. First seen May 2024 on ropinirole with subtle left postural tremor with hand outstretched, bilateral intentional tremor, perhaps mild decrement on finger taps. More suggestive of ET than PD. No known family history of tremors.      At his last visit his Primidone dose was increased further.      INTERVAL  HISTORY:  He was feeling lightheaded with higher dosing of the Primidone   No worsening of his tremors with reducing the dose   He does not feel that the tremors are getting any worse as the day goes on   Tremors are most bothersome when he first wakes in the AM   He can struggle with eating however he feels this is tolerable   No issues with shaving or brushing his teeth   No tremors when sleeping or relaxing   Left sided tremor more then right   No head tremor     He had some vertigo a few weeks ago  No further episodes     Current medications:  Primidone 50mg 3tabs 10am - he had some side effects with the higher dosing     Prior medications:  Topiramate - avoided due to ocular hypertension possible glaucoma   Beta blockers - avoided given bradycardia    Ropinirole - no benefit                   Review of Systems I have personally reviewed the MA's review of systems and made changes as necessary.         Objective   There were no vitals taken for this visit.    Physical Exam  Constitutional:       Appearance: Normal appearance.   HENT:      Right Ear: Hearing normal.      Left Ear: Hearing normal.     Eyes:      Extraocular Movements: Extraocular movements intact.     Pulmonary:      Effort: Pulmonary effort is normal.     Neurological:      Mental Status: He is alert.      Motor: Motor strength is normal.    Psychiatric:         Speech: Speech normal.       Neurological Exam  Mental Status  Alert. Oriented to person, place, time and situation. Recent and remote memory are intact. Speech is normal. Follows complex commands. Attention and concentration are normal.    Cranial Nerves  CN III, IV, VI: Extraocular movements intact bilaterally.   Right pupil: 1 mm.   Left pupil: 1 mm.  CN VII: Full and symmetric facial movement.  CN VIII:  Right: Hearing is normal.  Left: Hearing is normal.  CN IX, X: Palate elevates symmetrically  CN XI: Shoulder shrug strength is normal.  CN XII: Tongue midline without atrophy or  fasciculations.    Motor   Normal muscle tone. Strength is 5/5 throughout all four extremities.    Coordination    Mild left postural tremor with hand outstretched. 1  Very slight on the right   Bilateral intentional tremor on FTN L>R 2,1   Right greater than left wing beat tremor   No head, leg or vocal tremor.   No rest tremor.  Some generalized bradykinesia, no decrement   No rigidity   .    Gait  Casual gait is normal including stance, stride, and arm swing.  Normal speed.  No freezing or festination.  Good clearance..

## 2025-07-17 NOTE — ASSESSMENT & PLAN NOTE
Patient with slowly progressive left greater than right action and postural hand tremors for a number of years.  Unfortunately he had side effects with higher dosing of primidone and has since reduced down to 3 tabs just in the morning.  Tremors remain however they are overall tolerable for him at this time.  On exam he was noted to have left greater than right action hand tremors along with very slight postural hand tremor.  No clear parkinsonian symptoms.  At this time his history and exam remain consistent with his diagnosis of essential tremor.    We once again had a discussion in regards to alternative medication options for his essential tremor.  We have avoided propranolol given he does have a lower pulse and blood pressure.  We have also avoided topiramate given possible glaucoma.  He did research side effects for gabapentin and Remeron and at this time he is not interested in either of those.  We also did briefly discuss surgical options and he is not interested in these either.    At this time we will have him remain on primidone 50 mg 3 tabs in the morning.    Orders:    primidone (MYSOLINE) 50 mg tablet; Take 3 tabs qam

## 2025-07-25 ENCOUNTER — OFFICE VISIT (OUTPATIENT)
Dept: GERIATRICS | Facility: OTHER | Age: 75
End: 2025-07-25
Payer: COMMERCIAL

## 2025-07-25 VITALS
BODY MASS INDEX: 32.22 KG/M2 | OXYGEN SATURATION: 97 % | WEIGHT: 193.4 LBS | HEIGHT: 65 IN | SYSTOLIC BLOOD PRESSURE: 128 MMHG | HEART RATE: 66 BPM | TEMPERATURE: 98.3 F | DIASTOLIC BLOOD PRESSURE: 80 MMHG

## 2025-07-25 DIAGNOSIS — R97.20 BPH WITH ELEVATED PSA: ICD-10-CM

## 2025-07-25 DIAGNOSIS — D69.6 THROMBOCYTOPENIA (HCC): ICD-10-CM

## 2025-07-25 DIAGNOSIS — E53.8 VITAMIN B12 DEFICIENCY: ICD-10-CM

## 2025-07-25 DIAGNOSIS — R74.8 ELEVATED ALKALINE PHOSPHATASE LEVEL: ICD-10-CM

## 2025-07-25 DIAGNOSIS — M89.9 DISORDER OF BONE, UNSPECIFIED: ICD-10-CM

## 2025-07-25 DIAGNOSIS — N40.0 BPH WITH ELEVATED PSA: ICD-10-CM

## 2025-07-25 DIAGNOSIS — R90.89 MRI OF BRAIN ABNORMAL: Primary | ICD-10-CM

## 2025-07-25 PROCEDURE — 99214 OFFICE O/P EST MOD 30 MIN: CPT | Performed by: FAMILY MEDICINE

## 2025-07-25 NOTE — ASSESSMENT & PLAN NOTE
History of left greater than right hand tremors  Patient's primidone was recently increased and most likely causing side effect of vertigo  Patient to follow-up with Neurology.         Additional Notes (Optional): observe Detail Level: Simple Hide Additional Notes?: No

## 2025-07-27 PROBLEM — R74.8 ELEVATED ALKALINE PHOSPHATASE LEVEL: Status: ACTIVE | Noted: 2025-07-27

## 2025-07-28 DIAGNOSIS — E78.2 MIXED HYPERLIPIDEMIA: ICD-10-CM

## 2025-07-28 RX ORDER — ROSUVASTATIN CALCIUM 20 MG/1
20 TABLET, COATED ORAL DAILY
Qty: 90 TABLET | Refills: 2 | Status: SHIPPED | OUTPATIENT
Start: 2025-07-28

## 2025-08-14 ENCOUNTER — APPOINTMENT (OUTPATIENT)
Dept: LAB | Facility: CLINIC | Age: 75
End: 2025-08-14
Payer: COMMERCIAL

## 2025-08-21 ENCOUNTER — TELEPHONE (OUTPATIENT)
Age: 75
End: 2025-08-21